# Patient Record
Sex: MALE | Race: WHITE | Employment: OTHER | URBAN - METROPOLITAN AREA
[De-identification: names, ages, dates, MRNs, and addresses within clinical notes are randomized per-mention and may not be internally consistent; named-entity substitution may affect disease eponyms.]

---

## 2018-10-19 ENCOUNTER — PATIENT OUTREACH (OUTPATIENT)
Dept: CARE COORDINATION | Facility: CLINIC | Age: 83
End: 2018-10-19

## 2018-10-19 DIAGNOSIS — Z65.9 PSYCHOSOCIAL PROBLEM: Primary | ICD-10-CM

## 2018-10-19 NOTE — LETTER
October 22, 2018    Manuelito Kinney  [id number]   [facility] [address]      Dear Manuelito,    I am a clinic care coordinator who works with PETEY Medina CNP at [facility]. I wanted to thank you for spending the time to talk with me.  I wanted to introduce myself and provide you with my contact information so that you can call me with questions or concerns about your health care. Below is a description of clinic care coordination and how I can further assist you.     The clinic care coordinator is a  who understand the health care system. The goal of clinic care coordination is to help you manage your health and improve access to the health care system in the most efficient manner. The  can assist you with financial, behavioral, psychosocial, chemical dependency, counseling, and/or psychiatric resources.    Please feel free to contact me at [phone], with any questions or concerns.      Sincerely,     Yareli Pepper    Enclosed: I have enclosed helpful educational material. Please review and call me with any questions. and I have enclosed an Authorization to Discuss Protected Health Information form. Please review, fill out, sign and send back to me so I can make sure we have this on file to be able to talk to family/friends if you would like us to be able to.

## 2018-10-19 NOTE — PROGRESS NOTES
Clinic Care Coordination Contact  [facility]/Voicemail    Referral Source: Care Team  Clinical Data: Care Coordinator Outreach  Outreach attempted x 1.  Left message on voicemail with call back information and requested return call.  Plan:  Care Coordinator will try to reach patient again in 3-5 business days.    Yareli Pepper LENORE  Clinic Care Coordinator  [phone]  [email or web address]

## 2018-10-22 ASSESSMENT — ACTIVITIES OF DAILY LIVING (ADL): DEPENDENT_IADLS:: CLEANING;COOKING;LAUNDRY;SHOPPING;MONEY MANAGEMENT;TRANSPORTATION

## 2018-10-22 NOTE — PROGRESS NOTES
Clinic Care Coordination Contact  OUTREACH    Referral Information: Care Team requesting [facility] outreach to pt to provide options for supportive living. Pt became legally blind following a stroke and is home bound and reliant on limited support system for all complex IADL's.    Chief Complaint   Patient presents with     Clinic Care Coordination - Initial     Community Support Resources        Universal Utilization: No major concerns.  Clinic Utilization is appropriate, however difficult due to lack of support in the community. Pt relies on his niece to help with most appointments. Will send TERRI for pt to complete and return to the clinic to improve communication.  Utilization    Last refreshed: 10/19/2018 11:54 AM:  No Show Count (past year) 0       Last refreshed: 10/19/2018 11:54 AM:  ED visits 0       Last refreshed: 10/19/2018 11:54 AM:  Hospital admissions 0          Current as of: 10/19/2018 11:54 AM           Clinical Concerns:  Current Medical Concerns:  Sudden onset of blindness following a stroke. Pt denies any other major concerning health issues at this time. Pt does have a history of hyperliipidemia, GERD, and CKD stage IV.    Medication Management:  Pt sets up his own medications.- Slightly concerning as he does this by touch and feel of the medication- currently on three different medications.      Functional Status:  Dependent ADLs:: Independent- Pt is able to navigate his apartment, however is bound to his apartment due to visual impairments and lack of training related to change.   Dependent IADLs:: Cleaning, Cooking, Laundry, Shopping, Money Management, Transportation  Any fall with injury in the past year?: No    Living Situation:  Current living arrangement:: I live alone  Type of residence:: Apartment- [facility]    Diet/Exercise/Sleep:  Diet:: Regular  Inadequate nutrition: No- Does at times have difficulty preparing his own food. Heavily reliant on preprepared meals.  Exercise:: Currently  "not exercising    Transportation:  Transportation concerns:: No  Transportation means:: Family- Niece and her spouse assist with transportation about once a week. Pt not wanting any alternatives at this time as he is not able to leave the apartment on his own.      Psychosocial:  Jew or spiritual beliefs that impact treatment:: No  Mental health DX:: No- Pt endorses a relatively positive outlook regarding his situation. \"I just keep kicking the can down the road.\"  Informal Support system:: Family  Financial/Insurance: Pt's niece and nephew are pt's POA and help with bill pay and writing check (high vulnerability risk)   Pt is over income criteria for any [facility] programs. Pt has looked at waiver services and will eventually need to transition to [facility] services should he need long term care services- AL is provided on campus he is currently on. Discussed Alternative Care Waiver programs.   Education provided: Senior Linkage Line, LTC assessment process through the [facility], educated on MA/EW/ACW, longterm and LTC placement- how they differ, State Services for the blind- pt  community involvement- including companionship program, private duty services to assist at home, and transport resources.      Resources and Interventions:  Current Resources: No formal supports in place  Equipment Currently Used at Home: none  Advance Care Plan/Directive- Will send information about HCD for pt to complete  Referrals Placed: Community Resources, County Resources, Honoring Choices     Patient/Caregiver understanding: Pt reports understanding and denies any additional questions or concerns at this times. FELICITAS CC engaged in AIDET communication during encounter.    Outreach Frequency: monthly    Plan: FELICITAS CC will make a referral to [facility] companionship program and send information about the Alternative Care Waiver, Advanced Care Planning materials,and a TERRI to complete and return to the clinic. Will also include contact information for SW " CC. Follow-up in 4 weeks.    Yareli Gtz [facility]  Clinic Care Coordinator  [phone]  [email or web address]

## 2018-11-23 ENCOUNTER — PATIENT OUTREACH (OUTPATIENT)
Dept: CARE COORDINATION | Facility: CLINIC | Age: 83
End: 2018-11-23

## 2018-11-23 NOTE — PROGRESS NOTES
Clinic Care Coordination Contact  OUTREACH    Chief Complaint   Patient presents with     Clinic Care Coordination - Follow-up     Community Support Resources     Spoke with pt today to review resources that were sent via mail. Pt requested that FELICITAS CC outreach to pt's gordon Mayberry. Outreach attempted x 1.  Left message on voicemail with call back information and requested return call.    Plan: Care Coordinator will try to reach patient again in 3-5 business days.    Yareli Gtz LENORE  Clinic Care Coordinator  [phone]  [email or web address]

## 2018-11-29 NOTE — PROGRESS NOTES
Clinic Care Coordination Contact  [facility]/Voicemail       Clinical Data: Care Coordinator Outreach  Outreach attempted x 2.  Left message on voicemail with call back information and requested return call.  Plan: Care Coordinator had previously mailed out a letter. Care Coordinator will do no further outreaches at this time.    Yareli Gtz [place]  Clinic Care Coordinator  [phone]  [email or web address]

## 2020-08-05 ENCOUNTER — APPOINTMENT (OUTPATIENT)
Dept: CT IMAGING | Facility: CLINIC | Age: 85
DRG: 683 | End: 2020-08-05
Attending: EMERGENCY MEDICINE
Payer: COMMERCIAL

## 2020-08-05 ENCOUNTER — HOSPITAL ENCOUNTER (INPATIENT)
Facility: CLINIC | Age: 85
LOS: 2 days | Discharge: NURSING, REHAB OR LONG-TERM CARE | DRG: 683 | End: 2020-08-07
Attending: EMERGENCY MEDICINE | Admitting: INTERNAL MEDICINE
Payer: COMMERCIAL

## 2020-08-05 ENCOUNTER — APPOINTMENT (OUTPATIENT)
Dept: GENERAL RADIOLOGY | Facility: CLINIC | Age: 85
DRG: 683 | End: 2020-08-05
Attending: EMERGENCY MEDICINE
Payer: COMMERCIAL

## 2020-08-05 ENCOUNTER — APPOINTMENT (OUTPATIENT)
Dept: GENERAL RADIOLOGY | Facility: CLINIC | Age: 85
DRG: 683 | End: 2020-08-05
Attending: HOSPITALIST
Payer: COMMERCIAL

## 2020-08-05 DIAGNOSIS — K59.00 CONSTIPATION, UNSPECIFIED CONSTIPATION TYPE: ICD-10-CM

## 2020-08-05 DIAGNOSIS — R55 SYNCOPE, UNSPECIFIED SYNCOPE TYPE: ICD-10-CM
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
## 2020-08-05 DIAGNOSIS — T79.6XXA TRAUMATIC RHABDOMYOLYSIS, INITIAL ENCOUNTER (H): ICD-10-CM

## 2020-08-05 DIAGNOSIS — G47.00 INSOMNIA, UNSPECIFIED TYPE: Primary | ICD-10-CM

## 2020-08-05 DIAGNOSIS — S09.90XA CLOSED HEAD INJURY, INITIAL ENCOUNTER: ICD-10-CM

## 2020-08-05 PROBLEM — M62.82 RHABDOMYOLYSIS: Status: ACTIVE | Noted: 2020-08-05

## 2020-08-05 PROBLEM — N17.9 ACUTE KIDNEY INJURY (H): Status: ACTIVE | Noted: 2020-08-05

## 2020-08-05 LAB
ALBUMIN UR-MCNC: 100 MG/DL
ANION GAP SERPL CALCULATED.3IONS-SCNC: 6 MMOL/L (ref 3–14)
APPEARANCE UR: CLEAR
BACTERIA #/AREA URNS HPF: ABNORMAL /HPF
BASOPHILS # BLD AUTO: 0 10E9/L (ref 0–0.2)
BASOPHILS NFR BLD AUTO: 0.2 %
BILIRUB UR QL STRIP: NEGATIVE
BUN SERPL-MCNC: 25 MG/DL (ref 7–30)
CALCIUM SERPL-MCNC: 8.7 MG/DL (ref 8.5–10.1)
CHLORIDE SERPL-SCNC: 108 MMOL/L (ref 94–109)
CK SERPL-CCNC: 7131 U/L (ref 30–300)
CO2 SERPL-SCNC: 22 MMOL/L (ref 20–32)
COLOR UR AUTO: YELLOW
CREAT SERPL-MCNC: 1.96 MG/DL (ref 0.66–1.25)
DIFFERENTIAL METHOD BLD: ABNORMAL
EOSINOPHIL # BLD AUTO: 0 10E9/L (ref 0–0.7)
EOSINOPHIL NFR BLD AUTO: 0.1 %
ERYTHROCYTE [DISTWIDTH] IN BLOOD BY AUTOMATED COUNT: 13 % (ref 10–15)
GFR SERPL CREATININE-BSD FRML MDRD: 29 ML/MIN/{1.73_M2}
GLUCOSE SERPL-MCNC: 138 MG/DL (ref 70–99)
GLUCOSE UR STRIP-MCNC: NEGATIVE MG/DL
HCT VFR BLD AUTO: 36.6 % (ref 40–53)
HGB BLD-MCNC: 12 G/DL (ref 13.3–17.7)
HGB UR QL STRIP: ABNORMAL
IMM GRANULOCYTES # BLD: 0.1 10E9/L (ref 0–0.4)
IMM GRANULOCYTES NFR BLD: 0.6 %
INR PPP: 1.07 (ref 0.86–1.14)
INTERPRETATION ECG - MUSE: NORMAL
KETONES UR STRIP-MCNC: NEGATIVE MG/DL
LABORATORY COMMENT REPORT: NORMAL
LACTATE BLD-SCNC: 0.8 MMOL/L (ref 0.7–2)
LEUKOCYTE ESTERASE UR QL STRIP: NEGATIVE
LYMPHOCYTES # BLD AUTO: 2 10E9/L (ref 0.8–5.3)
LYMPHOCYTES NFR BLD AUTO: 11.3 %
MAGNESIUM SERPL-MCNC: 1.7 MG/DL (ref 1.6–2.3)
MCH RBC QN AUTO: 32.6 PG (ref 26.5–33)
MCHC RBC AUTO-ENTMCNC: 32.8 G/DL (ref 31.5–36.5)
MCV RBC AUTO: 100 FL (ref 78–100)
MONOCYTES # BLD AUTO: 1.5 10E9/L (ref 0–1.3)
MONOCYTES NFR BLD AUTO: 8.6 %
MUCOUS THREADS #/AREA URNS LPF: PRESENT /LPF
NEUTROPHILS # BLD AUTO: 13.9 10E9/L (ref 1.6–8.3)
NEUTROPHILS NFR BLD AUTO: 79.2 %
NITRATE UR QL: NEGATIVE
NRBC # BLD AUTO: 0 10*3/UL
NRBC BLD AUTO-RTO: 0 /100
PH UR STRIP: 5.5 PH (ref 5–7)
PLATELET # BLD AUTO: 210 10E9/L (ref 150–450)
POTASSIUM SERPL-SCNC: 3.6 MMOL/L (ref 3.4–5.3)
RBC # BLD AUTO: 3.68 10E12/L (ref 4.4–5.9)
RBC #/AREA URNS AUTO: 1 /HPF (ref 0–2)
SARS-COV-2 RNA SPEC QL NAA+PROBE: NEGATIVE
SARS-COV-2 RNA SPEC QL NAA+PROBE: NORMAL
SODIUM SERPL-SCNC: 136 MMOL/L (ref 133–144)
SOURCE: ABNORMAL
SP GR UR STRIP: 1.02 (ref 1–1.03)
SPECIMEN SOURCE: NORMAL
SPECIMEN SOURCE: NORMAL
TROPONIN I SERPL-MCNC: <0.015 UG/L (ref 0–0.04)
UROBILINOGEN UR STRIP-MCNC: NORMAL MG/DL (ref 0–2)
WBC # BLD AUTO: 17.5 10E9/L (ref 4–11)
WBC #/AREA URNS AUTO: 3 /HPF (ref 0–5)

## 2020-08-05 PROCEDURE — C9803 HOPD COVID-19 SPEC COLLECT: HCPCS

## 2020-08-05 PROCEDURE — 72128 CT CHEST SPINE W/O DYE: CPT

## 2020-08-05 PROCEDURE — 80048 BASIC METABOLIC PNL TOTAL CA: CPT | Performed by: EMERGENCY MEDICINE

## 2020-08-05 PROCEDURE — 71101 X-RAY EXAM UNILAT RIBS/CHEST: CPT | Mod: LT

## 2020-08-05 PROCEDURE — 85025 COMPLETE CBC W/AUTO DIFF WBC: CPT | Performed by: EMERGENCY MEDICINE

## 2020-08-05 PROCEDURE — 96360 HYDRATION IV INFUSION INIT: CPT

## 2020-08-05 PROCEDURE — 12000011 ZZH R&B MS OVERFLOW

## 2020-08-05 PROCEDURE — 99285 EMERGENCY DEPT VISIT HI MDM: CPT | Mod: 25

## 2020-08-05 PROCEDURE — U0003 INFECTIOUS AGENT DETECTION BY NUCLEIC ACID (DNA OR RNA); SEVERE ACUTE RESPIRATORY SYNDROME CORONAVIRUS 2 (SARS-COV-2) (CORONAVIRUS DISEASE [COVID-19]), AMPLIFIED PROBE TECHNIQUE, MAKING USE OF HIGH THROUGHPUT TECHNOLOGIES AS DESCRIBED BY CMS-2020-01-R: HCPCS | Performed by: EMERGENCY MEDICINE

## 2020-08-05 PROCEDURE — 83735 ASSAY OF MAGNESIUM: CPT | Performed by: EMERGENCY MEDICINE

## 2020-08-05 PROCEDURE — 85610 PROTHROMBIN TIME: CPT | Performed by: EMERGENCY MEDICINE

## 2020-08-05 PROCEDURE — 25800030 ZZH RX IP 258 OP 636: Performed by: EMERGENCY MEDICINE

## 2020-08-05 PROCEDURE — 84484 ASSAY OF TROPONIN QUANT: CPT | Performed by: EMERGENCY MEDICINE

## 2020-08-05 PROCEDURE — 71046 X-RAY EXAM CHEST 2 VIEWS: CPT

## 2020-08-05 PROCEDURE — 73030 X-RAY EXAM OF SHOULDER: CPT | Mod: LT

## 2020-08-05 PROCEDURE — 87040 BLOOD CULTURE FOR BACTERIA: CPT | Performed by: HOSPITALIST

## 2020-08-05 PROCEDURE — 93005 ELECTROCARDIOGRAM TRACING: CPT

## 2020-08-05 PROCEDURE — 99222 1ST HOSP IP/OBS MODERATE 55: CPT | Mod: AI | Performed by: INTERNAL MEDICINE

## 2020-08-05 PROCEDURE — 72125 CT NECK SPINE W/O DYE: CPT

## 2020-08-05 PROCEDURE — 81001 URINALYSIS AUTO W/SCOPE: CPT | Performed by: EMERGENCY MEDICINE

## 2020-08-05 PROCEDURE — 25000132 ZZH RX MED GY IP 250 OP 250 PS 637: Performed by: INTERNAL MEDICINE

## 2020-08-05 PROCEDURE — 70450 CT HEAD/BRAIN W/O DYE: CPT

## 2020-08-05 PROCEDURE — 82550 ASSAY OF CK (CPK): CPT | Performed by: EMERGENCY MEDICINE

## 2020-08-05 PROCEDURE — 36415 COLL VENOUS BLD VENIPUNCTURE: CPT | Performed by: HOSPITALIST

## 2020-08-05 PROCEDURE — 83605 ASSAY OF LACTIC ACID: CPT | Performed by: HOSPITALIST

## 2020-08-05 PROCEDURE — 96361 HYDRATE IV INFUSION ADD-ON: CPT

## 2020-08-05 RX ORDER — POTASSIUM CL/LIDO/0.9 % NACL 10MEQ/0.1L
10 INTRAVENOUS SOLUTION, PIGGYBACK (ML) INTRAVENOUS
Status: DISCONTINUED | OUTPATIENT
Start: 2020-08-05 | End: 2020-08-07 | Stop reason: HOSPADM

## 2020-08-05 RX ORDER — GUAIFENESIN AND DEXTROMETHORPHAN HYDROBROMIDE 600; 30 MG/1; MG/1
1 TABLET, EXTENDED RELEASE ORAL DAILY PRN
COMMUNITY

## 2020-08-05 RX ORDER — POTASSIUM CHLORIDE 29.8 MG/ML
20 INJECTION INTRAVENOUS
Status: DISCONTINUED | OUTPATIENT
Start: 2020-08-05 | End: 2020-08-07 | Stop reason: HOSPADM

## 2020-08-05 RX ORDER — ACETAMINOPHEN 325 MG/1
650 TABLET ORAL EVERY 4 HOURS PRN
Status: DISCONTINUED | OUTPATIENT
Start: 2020-08-05 | End: 2020-08-07 | Stop reason: HOSPADM

## 2020-08-05 RX ORDER — QUETIAPINE FUMARATE 50 MG/1
50 TABLET, FILM COATED ORAL AT BEDTIME
Status: ON HOLD | COMMUNITY
End: 2020-08-07

## 2020-08-05 RX ORDER — BISACODYL 10 MG
10 SUPPOSITORY, RECTAL RECTAL DAILY PRN
Status: DISCONTINUED | OUTPATIENT
Start: 2020-08-05 | End: 2020-08-07 | Stop reason: HOSPADM

## 2020-08-05 RX ORDER — ASPIRIN 81 MG/1
81 TABLET ORAL DAILY
Status: DISCONTINUED | OUTPATIENT
Start: 2020-08-05 | End: 2020-08-05 | Stop reason: CLARIF

## 2020-08-05 RX ORDER — ONDANSETRON 4 MG/1
4 TABLET, ORALLY DISINTEGRATING ORAL EVERY 6 HOURS PRN
Status: DISCONTINUED | OUTPATIENT
Start: 2020-08-05 | End: 2020-08-07 | Stop reason: HOSPADM

## 2020-08-05 RX ORDER — CHLORAL HYDRATE 500 MG
1 CAPSULE ORAL DAILY
COMMUNITY

## 2020-08-05 RX ORDER — POTASSIUM CHLORIDE 1.5 G/1.58G
20-40 POWDER, FOR SOLUTION ORAL
Status: DISCONTINUED | OUTPATIENT
Start: 2020-08-05 | End: 2020-08-07 | Stop reason: HOSPADM

## 2020-08-05 RX ORDER — ONDANSETRON 2 MG/ML
4 INJECTION INTRAMUSCULAR; INTRAVENOUS EVERY 6 HOURS PRN
Status: DISCONTINUED | OUTPATIENT
Start: 2020-08-05 | End: 2020-08-07 | Stop reason: HOSPADM

## 2020-08-05 RX ORDER — POTASSIUM CHLORIDE 1500 MG/1
20-40 TABLET, EXTENDED RELEASE ORAL
Status: DISCONTINUED | OUTPATIENT
Start: 2020-08-05 | End: 2020-08-07 | Stop reason: HOSPADM

## 2020-08-05 RX ORDER — AMOXICILLIN 250 MG
2 CAPSULE ORAL 2 TIMES DAILY PRN
Status: DISCONTINUED | OUTPATIENT
Start: 2020-08-05 | End: 2020-08-07 | Stop reason: HOSPADM

## 2020-08-05 RX ORDER — PROCHLORPERAZINE 25 MG
12.5 SUPPOSITORY, RECTAL RECTAL EVERY 12 HOURS PRN
Status: DISCONTINUED | OUTPATIENT
Start: 2020-08-05 | End: 2020-08-07 | Stop reason: HOSPADM

## 2020-08-05 RX ORDER — AMOXICILLIN 250 MG
1 CAPSULE ORAL 2 TIMES DAILY PRN
Status: DISCONTINUED | OUTPATIENT
Start: 2020-08-05 | End: 2020-08-07 | Stop reason: HOSPADM

## 2020-08-05 RX ORDER — PROCHLORPERAZINE MALEATE 5 MG
5 TABLET ORAL EVERY 6 HOURS PRN
Status: DISCONTINUED | OUTPATIENT
Start: 2020-08-05 | End: 2020-08-07 | Stop reason: HOSPADM

## 2020-08-05 RX ORDER — TAMSULOSIN HYDROCHLORIDE 0.4 MG/1
0.4 CAPSULE ORAL DAILY
Status: DISCONTINUED | OUTPATIENT
Start: 2020-08-05 | End: 2020-08-07 | Stop reason: HOSPADM

## 2020-08-05 RX ORDER — LIDOCAINE 40 MG/G
CREAM TOPICAL
Status: DISCONTINUED | OUTPATIENT
Start: 2020-08-05 | End: 2020-08-07 | Stop reason: HOSPADM

## 2020-08-05 RX ORDER — MULTIVIT WITH MINERALS/LUTEIN
1 TABLET ORAL DAILY
COMMUNITY

## 2020-08-05 RX ORDER — MAGNESIUM SULFATE HEPTAHYDRATE 40 MG/ML
4 INJECTION, SOLUTION INTRAVENOUS EVERY 4 HOURS PRN
Status: DISCONTINUED | OUTPATIENT
Start: 2020-08-05 | End: 2020-08-07 | Stop reason: HOSPADM

## 2020-08-05 RX ORDER — SODIUM CHLORIDE 9 MG/ML
INJECTION, SOLUTION INTRAVENOUS CONTINUOUS
Status: DISCONTINUED | OUTPATIENT
Start: 2020-08-05 | End: 2020-08-06

## 2020-08-05 RX ORDER — NALOXONE HYDROCHLORIDE 0.4 MG/ML
.1-.4 INJECTION, SOLUTION INTRAMUSCULAR; INTRAVENOUS; SUBCUTANEOUS
Status: DISCONTINUED | OUTPATIENT
Start: 2020-08-05 | End: 2020-08-07 | Stop reason: HOSPADM

## 2020-08-05 RX ORDER — ACETAMINOPHEN 500 MG
500-1000 TABLET ORAL 3 TIMES DAILY
COMMUNITY

## 2020-08-05 RX ORDER — CAFFEINE 200 MG
100-300 TABLET ORAL EVERY MORNING
Status: ON HOLD | COMMUNITY
End: 2020-08-07

## 2020-08-05 RX ORDER — AMLODIPINE BESYLATE 10 MG/1
10 TABLET ORAL DAILY
Status: DISCONTINUED | OUTPATIENT
Start: 2020-08-05 | End: 2020-08-07 | Stop reason: HOSPADM

## 2020-08-05 RX ORDER — POTASSIUM CHLORIDE 7.45 MG/ML
10 INJECTION INTRAVENOUS
Status: DISCONTINUED | OUTPATIENT
Start: 2020-08-05 | End: 2020-08-07 | Stop reason: HOSPADM

## 2020-08-05 RX ADMIN — TAMSULOSIN HYDROCHLORIDE 0.4 MG: 0.4 CAPSULE ORAL at 14:29

## 2020-08-05 RX ADMIN — OMEPRAZOLE 20 MG: 20 CAPSULE, DELAYED RELEASE ORAL at 14:29

## 2020-08-05 RX ADMIN — AMLODIPINE BESYLATE 10 MG: 10 TABLET ORAL at 09:14

## 2020-08-05 RX ADMIN — Medication 2.5 MG: at 09:14

## 2020-08-05 RX ADMIN — SODIUM CHLORIDE 1000 ML: 9 INJECTION, SOLUTION INTRAVENOUS at 02:21

## 2020-08-05 RX ADMIN — Medication 2.5 MG: at 18:20

## 2020-08-05 RX ADMIN — ACETAMINOPHEN 650 MG: 325 TABLET, FILM COATED ORAL at 09:13

## 2020-08-05 ASSESSMENT — ENCOUNTER SYMPTOMS
ABDOMINAL PAIN: 0
NECK PAIN: 0
NUMBNESS: 0
COUGH: 0
HEADACHES: 0
ARTHRALGIAS: 1
VOMITING: 0
DIARRHEA: 0
FEVER: 0
WEAKNESS: 0

## 2020-08-05 ASSESSMENT — MIFFLIN-ST. JEOR
SCORE: 1455.51
SCORE: 1428.29

## 2020-08-05 NOTE — PHARMACY-ADMISSION MEDICATION HISTORY
Admission medication history interview status for this patient is complete. See [facility] admission navigator for allergy information, prior to admission medications and immunization status.     Medication history interview done via telephone during Covid-19 pandemic, indicate source(s): Patient and pt's niece Shawanda  Medication history resources (including written lists, pill bottles, clinic record): care everywhere    Changes made to PTA medication list:  Added: quetiapine, tylenol extra strength, tylenol pm, vivarin, metamucil packet  Deleted: aspirin  Changed: fish oil to once daily, omeprazole to 20 mg    Actions taken by pharmacist (provider contacted, etc): called the pt and called his niece so helps set up his meds once weekly      Additional medication history information: stopped taking aspirin due to his kidneys, he took fenofibrate for his triglycerides (which were over 700), but he took 2 doses and stopped due to side effects. The pt is taking a medication for nightmares -- I believe it is quetiapine, as this is his only nighttime med and was filled on 7/28.    Medication reconciliation/reorder completed by provider prior to medication history?  Y   (Y/N)     For patients on insulin therapy: N  (Y/N)    Prior to Admission medications    Medication Sig Last Dose Taking? Auth Provider   acetaminophen (TYLENOL) 500 MG tablet Take 500-1,000 mg by mouth 3 times daily  8/3/2020 Yes Unknown, Entered By History   amLODIPine (NORVASC) 10 MG tablet Take 1 tablet (10 mg) by mouth daily . Patient prefers generic. 8/3/2020 Yes Kwame Garcia MD   caffeine (NO-DOZE) 200 MG TABS tablet Take 100-300 mg by mouth every morning 8/3/2020 at am Yes Unknown, Entered By History   dextromethorphan-guaiFENesin (MUCINEX DM)  MG 12 hr tablet Take 1 tablet by mouth daily as needed for cough 8/3/2020 Yes Unknown, Entered By History   diphenhydrAMINE-acetaminophen (TYLENOL PM)  MG tablet Take 1 tablet by mouth nightly as  needed for sleep 8/2/2020 at pm Yes Unknown, Entered By History   fish oil-omega-3 fatty acids 1000 MG capsule Take 1 g by mouth daily 8/3/2020 Yes Unknown, Entered By History   multivitamin (CENTRUM SILVER) tablet Take 1 tablet by mouth daily 8/3/2020 at am Yes Unknown, Entered By History   omeprazole (PRILOSEC) 20 MG DR capsule Take 20 mg by mouth daily 8/3/2020 Yes Unknown, Entered By History   psyllium (METAMUCIL/KONSYL) Packet Take 1 packet by mouth daily 8/3/2020 Yes Unknown, Entered By History   QUEtiapine (SEROQUEL) 50 MG tablet Take 50 mg by mouth At Bedtime  8/2/2020 at Unknown time Yes Unknown, Entered By History   Lactobacillus Rhamnosus, GG, (CULTURELL) capsule Take 1 capsule by mouth 3 times daily Unknown at Unknown time  Waqas Lanza, DO   loperamide (IMODIUM) 2 MG capsule Take 1 capsule (2 mg) by mouth 4 times daily as needed for diarrhea Unknown at Unknown time  Waqas Lanza, DO   Multiple Vitamins-Minerals (MULTIVITAMIN & MINERAL PO) Take 1 tablet by mouth daily  8/3/2020 at am  Reported, Patient   tamsulosin (FLOMAX) 0.4 MG 24 hr capsule Take 1 capsule (0.4 mg) by mouth daily 8/3/2020  Kwame Garcia MD

## 2020-08-05 NOTE — ED NOTES
"[facility]  ED Nurse Handoff Report    Manuelito Kinney is a 89 year old male   ED Chief complaint: Fall and Back Pain  . ED Diagnosis:   Final diagnoses:   Syncope, unspecified syncope type   Closed head injury, initial encounter     Allergies:   Allergies   Allergen Reactions     Monosodium Glutamate      Nsaids GI Disturbance     Soy        Code Status: Full Code  Activity level - Baseline/Home:  Independent. Activity Level - Current:   Assist X 2. Lift room needed: No. Bariatric: No   Needed: No   Isolation: No. Infection: Not Applicable.     Vital Signs:   Vitals:    08/05/20 0141   BP: 138/71   Resp: 22   Temp: 98  F (36.7  C)   TempSrc: Oral   SpO2: 97%   Weight: 79.4 kg (175 lb)   Height: 1.727 m (5' 8\")       Cardiac Rhythm:  ,      Pain level: 0-10 Pain Scale: 7  Patient confused: No. Patient Falls Risk: Yes.   Elimination Status: Has voided   Patient Report - Initial Complaint: Pt to ER with c/o fall . Focused Assessment: Pt to ER with c/o fall from possible near syncopal episode, pt is  blind  But very sharp mentally, pt has chronic back pain but was c/o rib pain as well as back pain after fall  Tests Performed: EKG labs CTs and xrays. UA Abnormal Results: WBC 17.5 , CK 7131 all other values are pretty much WNLs.   Treatments provided: IVFs meds  Family Comments: Here by himself  OBS brochure/video discussed/provided to patient:  Yes  ED Medications:   Medications   0.9% sodium chloride BOLUS (1,000 mLs Intravenous New Bag 8/5/20 0221)     Drips infusing:  No  For the majority of the shift, the patient's behavior Green. Interventions performed wereN/A.    Sepsis treatment initiated: No       ED Nurse Name/Phone Number: Guerline Kinney RN,   5:08 AM    RECEIVING UNIT ED HANDOFF REVIEW    Above ED Nurse Handoff Report was reviewed: Yes  Reviewed by: Rebecca Cornelius RN on August 5, 2020 at 6:34 AM         "

## 2020-08-05 NOTE — CONSULTS
"Care Transition Initial Assessment - RN        Met with: Nurse Tom at [facility].  DATA   Active Problems:    Fall    Traumatic rhabdomyolysis (H)    Acute kidney injury (H)    Rhabdomyolysis          Contact information and PCP information verified: No  Lives With: alone         Description of Support System: Supportive, Involved   Who is your support system?: Other (specify)(Niece)   Support Assessment: Adequate family and caregiver support   Insurance concerns: No Insurance issues identified  ASSESSMENT  Patient currently receives the following services:  Patient lives at [facility], Phone -[phone]/ [phone] Fax [phone]. Patient has a staff assisted walk 3 times weekly and gets VS checks daily per facility COVID protocol.         Identified issues/concerns regarding health management: RN CTS following for discharge planning. Patient admitted after 2 falls around the same time of day at [facility], hitting head resulting in a \"bump\" on the back of his head after the first fall.   Patient uses a tri-wheeled walker for ambulation. Patient is independent with ambulation and ADL's. Per nurse at [facility], patient has a niece who is supportive and assists with shopping and meal prep.   [facility] requests an early timing of discharge. Patient will need to be back to prior level of activity/ baseline prior to returning to [facility].   COVID test and PT/OT consults pending at this time.   PLAN  Financial costs for the patient were not discussed .       Plan/Disposition: TBD   Appointments: None made at this time.       Care  (CTS) will continue to follow as needed.    Lucina Medrano MA/RN Case Manager  Inpatient Care Coordination  [facility]   [phone]          "

## 2020-08-05 NOTE — PLAN OF CARE
Patient is alert and oriented x4. Patient did get lethargic after oxycodone administration. Pt blind and needing assist with feedings. Pt having pain in L rib cage and low back. Tylenol and oxycodone available as PRNs. Temp of 101.8F this morning. Came down without tylenol. Pt has elevated CK at 7131, IVF. Increased creat and WBC also. Repeat chest xray showing atelactasis. No abx at this time, continue monitoring temps and reassess this evening. Pt has not gotten out of bed, using slide board/hover veronica for transfers. Pt reports feeling weak. Pt has audible exp wheezes this am, has since resolved. Pt needing 1LPM via NC to maintain O2 sats above 92%. IS teaching done and helped patient with this throughout day. Pt was a PUI earlier this morning. Covid came back negative. Tolerating regular diet. PT/OT rescheduled for tomorrow d/t previous PUI status. Tele. SR 64.

## 2020-08-05 NOTE — H&P
[facility]  History and Physical   Hospitalist Service    Jarvis Garcia MD    Manuelito Kinney MRN# [id number]   YOB: 1930 Age: 89 year old      Date of Admission:  8/5/2020           Assessment and Plan:   Manuelito Kinney is a 89-year-old male with history of blindness, migraines, renal stone disease, GERD, chronic kidney disease, tonsillectomy, cystoscopy, and lumbar spine fusion.  He was brought to the emergency department from his senior facility early this morning after a second fall in 2 days.  He fell on the night of 8/3/2020.  Paramedics were called to the senior facility and helped him get up off the floor.  He was assessed and found to have some left-sided mild shoulder and chest pain.  He was doing okay so they left him at home.  One day later on 8/4/2020 he had a second fall.  Paramedics were called again.  They helped him up and brought him into emergency department for evaluation.  Manuelito denied loss of consciousness.  He thought that he just fell, possibly related to his impaired vision.  Emergency department evaluation showed unremarkable vital signs.  CBC showed leukocytosis with white blood cell count of 17.5.  Basic metabolic panel showed elevated creatinine of 1.96 (baseline seems to be approximate 1.6).  CK was elevated at 7131.  Troponin was undetectable.  INR was 1.07.  Urinalysis showed moderate blood and few bacteria but only 3 white blood cells, no nitrite, and no site esterase.  X-ray of shoulder shows no acute fracture or dislocation.  X-ray of ribs showed no fracture.  Left lung base atelectasis was noted.  I was asked to admit Manuelito to the hospital with rhabdomyolysis and acute kidney injury after probable mechanical fall.    Problem list:    1.  Probable mechanical fall resulting in neck and left shoulder strain.  Consider syncope although patient denies loss of consciousness.  Patient suspects the falls related to his blindness.  Physical therapy  occupational therapy will be consulted.    2.  Rhabdomyolysis with CK of 7131.  Hydrate with normal saline and 100 mL/h.  Check basic metabolic panel daily to monitor renal function.  Check CK level daily.    3.  Acute kidney injury, likely due to rhabdomyolysis, in the setting of chronic kidney disease stage III.  Monitor renal function with IV fluid hydration.  Avoid nephrotoxins.    4.  Leukocytosis.  Consider stress response.  No clear infection.  Monitor.  If fever develops obtain blood cultures and chest x-ray.    5.  Chronic blindness.    6.  GERD.  Continue prior to admission Prilosec.    Full code  Mechanical DVT prophylaxis  Disposition: Admit as inpatient as 2 nights of hospitalization are expected           Code Status:   Full Code         Primary Care Physician:   Karolina Osborn [phone]         Chief Complaint:   Falls    History is obtained from Dr. Terra Billings, and the medical record         History of Present Illness:   Manuelito Kinney is a 89-year-old male with history of blindness, migraines, renal stone disease, GERD, chronic kidney disease, tonsillectomy, cystoscopy, and lumbar spine fusion.  He was brought to the emergency department from his senior facility early this morning after a second fall in 2 days.  He fell on the night of 8/3/2020.  Paramedics were called to the senior facility and helped him get up off the floor.  He was assessed and found to have some left-sided mild shoulder and chest pain.  He was doing okay so they left him at home.  One day later on 8/4/2020 he had a second fall.  Paramedics were called again.  They helped him up and brought him into emergency department for evaluation.  Manuelito denied loss of consciousness.  He thought that he just fell, possibly related to his impaired vision.  Emergency department evaluation showed unremarkable vital signs.  CBC showed leukocytosis with white blood cell count of 17.5.  Basic metabolic panel showed elevated  creatinine of 1.96 (baseline seems to be approximate 1.6).  CK was elevated at 7131.  Troponin was undetectable.  INR was 1.07.  Urinalysis showed moderate blood and few bacteria but only 3 white blood cells, no nitrite, and no site esterase.  X-ray of shoulder shows no acute fracture or dislocation.  X-ray of ribs showed no fracture.  Left lung base atelectasis was noted.  I was asked to admit Manuelito to the hospital with rhabdomyolysis and acute kidney injury after probable mechanical fall.           Past Medical History:     Patient Active Problem List   Diagnosis     Mixed hyperlipidemia     HTN, goal below 140/90     Lumbago     GERD (gastroesophageal reflux disease)     Prostate cancer (H)     Chronic renal insufficiency, stage II (mild)     Renal calculus     Benign hypertension     Sepsis (H)     Chronic kidney disease, stage IV (severe) (H)     Fall     Traumatic rhabdomyolysis (H)     Acute kidney injury (H)      Past Medical History:   Diagnosis Date     Acid reflux      Calculus of kidney      Rash and other nonspecific skin eruption      Swelling of limb     joint pain/swelling     Variants of migraine, not elsewhere classified, without mention of intractable migraine without mention of status migrainosus              Past Surgical History:     Past Surgical History:   Procedure Laterality Date     C LUMBAR SPINE FUSION,ANTER APPRCH  2002    5 vertibrae lumbar area- affects R hip     CL AFF SURGICAL PATHOLOGY  2000    prostate cancer with radiation     HC CYSTOSCOPY,INSERT URETERAL STENT  3/15/10    LT     HC CYSTOURETHROSCOPY W/ URETEROSCOPY &/OR PYELOSCOPY, DIAGNOSTIC  3/15/10    RT     TONSILLECTOMY              Home Medications:     Prior to Admission medications    Medication Sig Last Dose Taking? Auth Provider   amLODIPine (NORVASC) 10 MG tablet Take 1 tablet (10 mg) by mouth daily . Patient prefers generic.   Kwame Garcia MD   ASPIRIN 81 MG OR TABS ONE DAILY   Mirtha Woodard MD  "  Dextromethorphan-Guaifenesin (MUCINEX DM PO) Take 1 tablet by mouth as needed   Reported, Patient   FISH OIL 1000 MG OR CAPS one three times a day  Patient taking differently: one TWICE DAILY   Mirtha Woodard MD   Lactobacillus Rhamnosus, GG, (CULTURELL) capsule Take 1 capsule by mouth 3 times daily   Waqas Lanza,    loperamide (IMODIUM) 2 MG capsule Take 1 capsule (2 mg) by mouth 4 times daily as needed for diarrhea   Waqas Lanza DO   Multiple Vitamins-Minerals (MULTIVITAMIN & MINERAL PO) Take 1 tablet by mouth daily    Reported, Patient   omeprazole (PRILOSEC) 40 MG capsule Take 1 capsule (40 mg) by mouth daily   Kwame Garcia MD   tamsulosin (FLOMAX) 0.4 MG 24 hr capsule Take 1 capsule (0.4 mg) by mouth daily   Kwame Garcia MD   UNABLE TO FIND MEDICATION NAME: H togo supplement   Unknown, Entered By History            Allergies:     Allergies   Allergen Reactions     Monosodium Glutamate      Nsaids GI Disturbance     Soy             Social History:     Social History     Tobacco Use     Smoking status: Never Smoker     Smokeless tobacco: Never Used   Substance Use Topics     Alcohol use: Yes     Comment: very rarely, 1-2 times a year             Family History:     Family History   Problem Relation Age of Onset     Cancer Brother         brain     Hypertension Mother      Hypertension Father      Hypertension Brother      Prostate Cancer Brother      C.A.D. No family hx of      Diabetes No family hx of      Cancer - colorectal No family hx of      Breast Cancer No family hx of               Review of Systems:   The 10 point Review of Systems is negative other than as noted in the HPI.           Physical Exam:   Blood pressure 138/71, temperature 98  F (36.7  C), temperature source Oral, resp. rate 22, height 1.727 m (5' 8\"), weight 79.4 kg (175 lb), SpO2 97 %.  175 lbs 0 oz      GENERAL: Pleasant and cooperative. No acute distress.  EYES: Blind. No conjunctival erythema or " icterus.  ENT: External ears are normal without deformity. Posterior oropharynx is without erythem, swelling, or exudate.  NECK: Supple. No masses or swelling. No tenderness. Thyroid is normal without mass or tenderness.  CHEST: Clear to auscultation. Normal breath sounds. No retractions.   CV: Regular rate and rhythm. No JVD. Pulses normal.  ABDOMEN: Bowel sounds present. No tenderness. No masses or hernia.  EXTREMETIES: No clubbing, cyanosis, or ischemia.  SKIN: Warm and dry to touch. No wounds or rashes.  NEUROLOGIC: Strength and sensation are normal. Deep tendon reflexes are normal. Cranial nerves are normal.             Data:   All new lab and imaging data was reviewed.     Results for orders placed or performed during the hospital encounter of 08/05/20 (from the past 24 hour(s))   CBC with platelets differential   Result Value Ref Range    WBC 17.5 (H) 4.0 - 11.0 10e9/L    RBC Count 3.68 (L) 4.4 - 5.9 10e12/L    Hemoglobin 12.0 (L) 13.3 - 17.7 g/dL    Hematocrit 36.6 (L) 40.0 - 53.0 %     78 - 100 fl    MCH 32.6 26.5 - 33.0 pg    MCHC 32.8 31.5 - 36.5 g/dL    RDW 13.0 10.0 - 15.0 %    Platelet Count 210 150 - 450 10e9/L    Diff Method Automated Method     % Neutrophils 79.2 %    % Lymphocytes 11.3 %    % Monocytes 8.6 %    % Eosinophils 0.1 %    % Basophils 0.2 %    % Immature Granulocytes 0.6 %    Nucleated RBCs 0 0 /100    Absolute Neutrophil 13.9 (H) 1.6 - 8.3 10e9/L    Absolute Lymphocytes 2.0 0.8 - 5.3 10e9/L    Absolute Monocytes 1.5 (H) 0.0 - 1.3 10e9/L    Absolute Eosinophils 0.0 0.0 - 0.7 10e9/L    Absolute Basophils 0.0 0.0 - 0.2 10e9/L    Abs Immature Granulocytes 0.1 0 - 0.4 10e9/L    Absolute Nucleated RBC 0.0    Basic metabolic panel   Result Value Ref Range    Sodium 136 133 - 144 mmol/L    Potassium 3.6 3.4 - 5.3 mmol/L    Chloride 108 94 - 109 mmol/L    Carbon Dioxide 22 20 - 32 mmol/L    Anion Gap 6 3 - 14 mmol/L    Glucose 138 (H) 70 - 99 mg/dL    Urea Nitrogen 25 7 - 30 mg/dL     Creatinine 1.96 (H) 0.66 - 1.25 mg/dL    GFR Estimate 29 (L) >60 mL/min/[1.73_m2]    GFR Estimate If Black 34 (L) >60 mL/min/[1.73_m2]    Calcium 8.7 8.5 - 10.1 mg/dL   Troponin I   Result Value Ref Range    Troponin I ES <0.015 0.000 - 0.045 ug/L   INR   Result Value Ref Range    INR 1.07 0.86 - 1.14   CK total   Result Value Ref Range    CK Total 7,131 (HH) 30 - 300 U/L   EKG 12-lead, tracing only   Result Value Ref Range    Interpretation ECG Click View Image link to view waveform and result    Head CT w/o contrast    Narrative    EXAM: CT HEAD W/O CONTRAST  LOCATION: [facility]  DATE/TIME: 8/5/2020 3:00 AM    INDICATION: Fall. Pain.  COMPARISON: CT head dated 06/03/2015.  TECHNIQUE: Routine without IV contrast. Multiplanar reformats. Dose reduction techniques were used.    FINDINGS:  INTRACRANIAL CONTENTS: No intracranial hemorrhage, extraaxial collection, or mass effect.  Age-indeterminate right basal ganglia lacunar type infarction. No acute large territory infarction. Moderate presumed chronic small vessel ischemic changes. Mild   to moderate generalized volume loss. No hydrocephalus.     VISUALIZED ORBITS/SINUSES/MASTOIDS: Prior bilateral cataract surgery. Visualized portions of the orbits are otherwise unremarkable. No paranasal sinus mucosal disease. No middle ear or mastoid effusion.    BONES/SOFT TISSUES: Left posterior scalp soft tissue contusion. No acute osseous abnormality.      Impression    IMPRESSION:  1.  No CT evidence for acute traumatic intracranial process.  2.  Age-indeterminate right basal ganglia lacunar type infarction. If the patient is experiencing an acute, focal or ongoing neurologic deficit, consider MRI for further assessment.  3.  Brain atrophy and presumed chronic microvascular ischemic changes as above.   Cervical spine CT w/o contrast    Narrative    EXAM: CT CERVICAL SPINE W/O CONTRAST  LOCATION: [facility]  DATE/TIME: 8/5/2020 3:00 AM    INDICATION:  Trauma. Fall. Pain.  COMPARISON: None.  TECHNIQUE: Routine without IV contrast. Multiplanar reformats. Dose reduction techniques were used.    FINDINGS:  VERTEBRA: Normal vertebral body heights. 3 mm of grade 1 anterolisthesis at C4 on C5. Trace grade 1 anterolisthesis of the C7 on T1. No acute fracture.     CANAL/FORAMINA: Multilevel degenerative changes of the cervical spine. No high-grade central spinal canal stenosis. There is moderate left neural foraminal stenosis at C3-C4. There is severe left neural foraminal stenosis at C4-C5. There is severe right   and moderate left neural foraminal stenosis at C5-C6. There is moderate bilateral neural foraminal stenosis at C6-C7.    PARASPINAL: No prevertebral soft tissue swelling.      Impression    IMPRESSION:  1.  No acute fracture identified.   2.  Grade 1 anterolisthesis at C4 on C5 and C7 on T1. These findings are likely degenerative in etiology, however, ligamentous injury could have a similar appearance.  3.  Multilevel degenerative changes of the cervical spine, as above.   CT Thoracic Spine w/o Contrast    Narrative    EXAM: CT THORACIC SPINE W/O CONTRAST  LOCATION: [facility]  DATE/TIME: 8/5/2020 3:00 AM    INDICATION: Trauma. Pain. Fall.  COMPARISON: None.  TECHNIQUE: Routine without IV contrast. Multiplanar reformats.  Dose reduction techniques were used.     FINDINGS:  VERTEBRA: Normal vertebral body heights. No acute traumatic malalignment. No acute fracture.     CANAL/FORAMINA: Multilevel degenerative changes are present. Multilevel anterolateral osteophytes are seen. No high-grade canal or neural foraminal stenosis.    PARASPINAL: No acute extraspinal abnormality. Scattered atheromatous vascular calcification of the aorta. Bilateral lower lobe dependent atelectasis is seen.      Impression    IMPRESSION:  1.  No fracture or posttraumatic subluxation.  2.  No high-grade spinal canal or neural foraminal stenosis.     Ribs XR, unilat 3 views +  PA chest,  left    Narrative    EXAM: XR RIBS AND CHEST LEFT 3 VIEW  LOCATION: [facility]  DATE/TIME: 08/05/2020, 3:44 AM    INDICATION: Left-sided rib pain after fall.  COMPARISON: 03/10/2017.    FINDINGS: Frontal chest and four views of the left ribs. The heart size is normal. There is atelectasis at the left lung base. The lungs are otherwise clear. No pneumothorax. No acute rib fracture.      Impression    IMPRESSION: Left basilar atelectasis. No rib fractures.     XR Shoulder Left G/E 3 Views    Narrative    EXAM: XR SHOULDER LEFT G/E 3 VIEW  LOCATION: [facility]  DATE/TIME: 08/05/2020, 3:45 AM    INDICATION: Pain after fall.  COMPARISON: None.      Impression    IMPRESSION: No acute fracture or dislocation.      UA with Microscopic   Result Value Ref Range    Color Urine Yellow     Appearance Urine Clear     Glucose Urine Negative NEG^Negative mg/dL    Bilirubin Urine Negative NEG^Negative    Ketones Urine Negative NEG^Negative mg/dL    Specific Gravity Urine 1.021 1.003 - 1.035    Blood Urine Moderate (A) NEG^Negative    pH Urine 5.5 5.0 - 7.0 pH    Protein Albumin Urine 100 (A) NEG^Negative mg/dL    Urobilinogen mg/dL Normal 0.0 - 2.0 mg/dL    Nitrite Urine Negative NEG^Negative    Leukocyte Esterase Urine Negative NEG^Negative    Source Catheterized Urine     WBC Urine 3 0 - 5 /HPF    RBC Urine 1 0 - 2 /HPF    Bacteria Urine Few (A) NEG^Negative /HPF    Mucous Urine Present (A) NEG^Negative /LPF

## 2020-08-05 NOTE — ED NOTES
Bed: ED08  Expected date: 8/5/20  Expected time: 1:32 AM  Means of arrival: Ambulance  Comments:  Kris [address]96 89M fall, back pain

## 2020-08-05 NOTE — PROGRESS NOTES
Progress Note    Patient admitted earlier today. H&P reviewed. Chart reviewed.  Spiked temp this am: 101.8  Mildly hypoxic to high 80's, now needing 1 L  Had spine imaging on admission which included CXR: L basilar atelectasis  UA appears non-infected    89 year old with history  blindness, migraines, renal stone disease, GERD, chronic kidney disease, tonsillectomy, cystoscopy, and lumbar spine fusion, prostate CA admitted 8/5/2020 with falls x 1    Falls    - appear to have been mechanical (lives alone, blind, needs walker)    - will have PT assess after covid testing results    - may need TCU    Fever    - no clear source    - has elevated on WBC (17.5)    - now mildly hypoxic    - re-swabbed as symptomatic covid: addendum: returned negative    - UA neg    - blood culture obtained    - had CXR with rib imaging in ED, will hold off on any further imaging unless changes clinicallly    - addendum: will obtain 2 view CXR in setting of fever, mild hypoxia and now negative covid    Addendum: reviewed CXR with radiologist. Feels LLL findings are atelectasis. Will not start antibiotics at this point. If has fever overnight, would start azith/ceftriaxone to cover CAP.    Rhabdomyolysis    - CK 7131    - IVF at 100cc/hr    - Cr 1.96 (may be his baseline, last Cr was from 2016 at 1.6)      CKD    - unclear what his recent baseline is    - IVF as above    Blindness    GERD    - cont PPI    Called niece and left her a message updating her.    Robby Barr MD

## 2020-08-05 NOTE — PLAN OF CARE
ROOM # 220     Living Situation (if not independent, order SW consult): [facility]   Facility name: [facility]  : Niece    Activity level at baseline: Ind with walker  Activity level on admit: Ax2      Patient registered to observation; given Patient Bill of Rights; given the opportunity to ask questions about observation status and their plan of care.  Patient has been oriented to the observation room, bathroom and call light is in place.    Discussed discharge goals and expectations with patient/family.

## 2020-08-05 NOTE — PLAN OF CARE
OT/PT: After thorough chart review and discussion with care team, decision made to hold PT/OT eval while testt pending for COVID-19. Will re-assess status daily.

## 2020-08-05 NOTE — PROGRESS NOTES
Pt up to recliner with assist of 2, pivoted to recliner, pt unsteady, felt weak, hunched over walker and needed lots of cuing.

## 2020-08-05 NOTE — PLAN OF CARE
"Vitals:BP (!) 141/69 (BP Location: Right arm)   Temp 98.2  F (36.8  C) (Oral)   Resp 22   Ht 1.753 m (5' 9\")   Wt 80.5 kg (177 lb 8 oz)   SpO2 96%   BMI 26.21 kg/m    Labs:CK 7,131, creat 1.96, COVID neg  Cardiac:WDL, SR with 1st degree AVB HR 60s   Resp:Pt reports pain over left side with deep breathing, IS encouraged, 1L NC started on day shift  Neuro:WDL, blind baseline  GI:WDL, LBM 8/3  :Pt reports hesitancy and has incontinence, hx prostate cancer  Skin:WDL  Activity:Up with 2 and walker to recliner  Diet:Regular, assist with feeding  Pain:Pt had oxycodone on day shift which caused sleepiness, avoiding tylenol as to not mask fever, pt declined medication and ice pack for left rib and hip pain initially. Pt. Given 2.5mg oxycodone for right hip pain which is chronic per pt and left rib pain which is acute  Plan:Watch for fevers, PT/OT, IV fluids     "

## 2020-08-05 NOTE — ED TRIAGE NOTES
"Pt presents via EMS for evaluation of left sided rib and upper back pain. Had a fall last night, EMS was called, but pt was not transported. Tonight, pt was on the toilet and was found on the floor. Unsure how he got on the floor, but states \"his legs went under him\". Was on the roseann for about an hour before pushing pendant. Lives independently at [facility]. Gets weekly tests for COVID at [facility], all have been negative. Hx of chronic back pain. Pt is legally blind.  "

## 2020-08-05 NOTE — ED PROVIDER NOTES
History     Chief Complaint: Fall and Back Pain    HPI   Manuelito Kinney is a 89 year old male with history of CKD, prostate cancer, legal blindness who presents with fall and syncopal event.  Patient lives in independent type senior living facility.  Patient states that 24 hours ago while he was getting ready for bed, he was reaching for something in his room when he reached out and fell to the ground, hitting the left side of his head and shoulder and chest.  He denies LOC at that time. He did not seek medical evaluation at that time or go to the hospital but paramedics came to evaluate him and helped him off the ground.  This evening while in the bathroom, patient was getting ready for bed and next thing he remembers he was on the ground.  He pressed his lifeline button.  Currently he has ongoing pain in the left shoulder, between the shoulder blades, and in the left chest wall.  He is blind at baseline but denies any new numbness or weakness in extremities.  He denies any new neck pain or low back pain or headaches or fever or cough or vomiting or diarrhea or abdominal pain.    Allergies:  No known drug allergies    Medications:    Seroquel  Amlodipine  Aspirin 81 mg    Past Medical History:    Hyperlipidemia  Hypertension  Gastro-esophageal reflux disease  Prostate cancer  Sepsis   Chronic kidney disease stage 4  Migraines    Past Surgical History:    Lumbar spine fusion  Prostate cancer radiation treatment  Ureteral stent placement  Tonsillectomy    Family History:    Brain cancer (brother)  Hypertension (mother, father, brother)  Prostate cancer (brother)    Social History:  Smoking status: Never  Alcohol use: Yes  Drug use: No  PCP: Karolina Osborn  Presents to the ED via EMS from [facility]  Marital Status:  Single [1]    Review of Systems   Constitutional: Negative for fever.   Respiratory: Negative for cough.    Gastrointestinal: Negative for abdominal pain, diarrhea and vomiting.   Musculoskeletal:  "Positive for arthralgias. Negative for neck pain.   Neurological: Positive for syncope. Negative for weakness, numbness and headaches.   All other systems reviewed and are negative.      Physical Exam     Patient Vitals for the past 24 hrs:   BP Temp Temp src Heart Rate Resp SpO2 Height Weight   08/05/20 0141 138/71 98  F (36.7  C) Oral 99 22 97 % 1.727 m (5' 8\") 79.4 kg (175 lb)     Physical Exam  HENT: scalp atraumatic. No midface instability.   EYES: pupils equal, dysconjugate gaze  NECK: no midline C spine ttp nor stepoffs  CV: Rate as noted, regular rhythm.  RESP: Effort normal. Symmetric chest rise, Breath sounds are present bilaterally  CHEST: No chest wall tenderness with AP and but mild ttp over left lateral chest wall  GI: Abdomen not tender, not distended  : no perineal rashes  PELVIS: stable  NEURO:   Motor 6=Obeys commands   Verbal 5=Oriented   Eye Opening 4=Spontaneous   GCS Total: 15     No facial asymmetry, FERRIS equally but weakness in BLE limits hip flexion strength testing. SILT x4 extremities.   EXTREMITIES: No deformity of the extremities, Pain with PROM of L shoulder  BACK: + T spine ttp but  No L spine ttp nor step-offs  SKIN: warm and dry    Emergency Department Course   ECG (02:39:31):  Rate 91 bpm. CT interval 210. QRS duration 94. QT/QTc 342/420. P-R-T axes 44 -37 36. Sinus rhythm with 1st degree A-V block. Left axis deviation. Nonspecific ST and T wave abnormality. Abnormal ECG. No significant change compared to EKG dated 06/03/2015. Interpreted at 2045 by Santana Ellison MD.    Imaging:  Radiographic findings were communicated with the patient who voiced understanding of the findings.    Head CT w/o contrast:  No CT evidence for acute traumatic intracranial process. Age-indeterminate right basal ganglia lacunar type infarction. If the patient is experiencing an acute, focal or ongoing neurologic deficit, consider MRI for further assessment. Brain atrophy and presumed chronic " microvascular ischemic changes as above.   As read by Radiology.    Cervical spine CT w/o contrast:  No acute fracture identified. Grade 1 anterolisthesis at C4 on C5 and C7 on T1. These findings are likely degenerative in etiology, however, ligamentous injury could have a similar appearance. Multilevel degenerative changes of the cervical spine, as above.   As read by Radiology.    CT Thoracic Spine w/o Contrast:  No fracture or posttraumatic subluxation. No high-grade spinal canal or neural foraminal stenosis.   As read by Radiology.    Ribs XR, unilat 3 views + PA chest,  Left:  Left basilar atelectasis. No rib fractures.   As read by Radiology.    XR Shoulder Left G/E 3 Views:  No acute fracture or dislocation.   As read by Radiology.    Laboratory:  CBC: WBC 17.5 (H), HGB 12.0 (L) o/w WNL ()  BMP: Glucose 138 (H), Creatinine 1.96 (H), GFR 29 (L) o/w WNL  INR: 1.07  Troponin (collected 0217): <0.015  CK total: 7131 (HH)  Urinalysis with micro: Moderate blood, Protein albumin 100, Few bacteria, Mucous present, o/w Negative  Asymptomatic COVID-19 virus PCR swab: pending    Procedures: None.    Interventions:  0221 NS 1L IV Bolus    Emergency Department Course:  Past medical records, nursing notes, and vitals reviewed.  0200: I performed an exam of the patient and obtained history, as documented above.    IV inserted and Blood drawn. This was sent to the lab for further testing, results above.    EKG performed, results above.    The patient was sent for a XRs of the Left Shoulder and Ribs while in the emergency department, findings above.    The patient was sent for CTs of the Cervical Spine, Thoracic Spine, and Head while in the emergency department, findings above.    Findings and plan explained to the patient who consents to admission.     0522: Discussed the patient with Dr. Garcia, who will admit the patient to an Obs bed for further monitoring, evaluation, and treatment.     Impression & Plan       Covid-19  Manuelito Kinney was evaluated during a global COVID-19 pandemic, which necessitated consideration that the patient might be at risk for infection with the SARS-CoV-2 virus that causes COVID-19.   Applicable protocols for evaluation were followed during the patient's care.   COVID-19 was considered as part of the patient's evaluation. The plan for testing is:  a test was obtained during this visit.    Medical Decision Making:  Patient presents to the ER for evaluation of fall yesterday and syncopal event tonight.  On arrival vital signs within normal range.  On exam he has pain with passive range of motion of the left shoulder, tenderness along the left chest wall, tenderness along the thoracic back.  He is moving all extremities equally although has baseline weakness in lower extremities limiting strength testing.  I do not detect any obvious new focal deficits compared to his reported baseline.  GCS is 15.    CT of the head and thoracic spine and cervical spine reveal no evidence of fracture or intracranial hemorrhage.  X-ray of the left shoulder and ribs/chest reveal no evidence of fracture or dislocation pneumothorax or obvious pneumonia.  EKG is sinus rhythm at a single troponin is negative.  No evidence of ACS at this time.  Labs are notable for leukocytosis of 17, evidence of CKD.  Cath UA reveals no evidence of UTI.  CK was elevated at 7000 and 1 L normal saline bolus was initiated in the ER.    Patient has had one fall and one syncopal event in the past 24 to 48 hours.  I am concerned about his safety at home as well as the underlying cause of the syncopal event.  He was admitted to the hospital for further evaluation.    Diagnosis:    ICD-10-CM    1. Syncope, unspecified syncope type  R55 CK total     Asymptomatic COVID-19 Virus (Coronavirus) by PCR   2. Closed head injury, initial encounter  S09.90XA    3. Traumatic rhabdomyolysis, initial encounter (H)  T79.6XXA      Disposition: Admitted to  inpatient    I, Tej Lopez, am serving as a scribe at 3:24 AM on 8/5/2020 to document services personally performed by Santana Ellison MD based on my observations and the provider's statements to me.     Tej Lopez  8/5/2020   [facility] EMERGENCY DEPARTMENT       Santana Ellison MD  08/05/20 0618

## 2020-08-06 ENCOUNTER — APPOINTMENT (OUTPATIENT)
Dept: OCCUPATIONAL THERAPY | Facility: CLINIC | Age: 85
DRG: 683 | End: 2020-08-06
Attending: INTERNAL MEDICINE
Payer: COMMERCIAL

## 2020-08-06 ENCOUNTER — APPOINTMENT (OUTPATIENT)
Dept: PHYSICAL THERAPY | Facility: CLINIC | Age: 85
DRG: 683 | End: 2020-08-06
Attending: INTERNAL MEDICINE
Payer: COMMERCIAL

## 2020-08-06 LAB
ANION GAP SERPL CALCULATED.3IONS-SCNC: 4 MMOL/L (ref 3–14)
BUN SERPL-MCNC: 19 MG/DL (ref 7–30)
CALCIUM SERPL-MCNC: 7.8 MG/DL (ref 8.5–10.1)
CHLORIDE SERPL-SCNC: 110 MMOL/L (ref 94–109)
CK SERPL-CCNC: 4229 U/L (ref 30–300)
CO2 SERPL-SCNC: 25 MMOL/L (ref 20–32)
CREAT SERPL-MCNC: 1.64 MG/DL (ref 0.66–1.25)
ERYTHROCYTE [DISTWIDTH] IN BLOOD BY AUTOMATED COUNT: 13.1 % (ref 10–15)
GFR SERPL CREATININE-BSD FRML MDRD: 36 ML/MIN/{1.73_M2}
GLUCOSE SERPL-MCNC: 119 MG/DL (ref 70–99)
HCT VFR BLD AUTO: 31.8 % (ref 40–53)
HGB BLD-MCNC: 10.2 G/DL (ref 13.3–17.7)
MCH RBC QN AUTO: 32.2 PG (ref 26.5–33)
MCHC RBC AUTO-ENTMCNC: 32.1 G/DL (ref 31.5–36.5)
MCV RBC AUTO: 100 FL (ref 78–100)
PLATELET # BLD AUTO: 184 10E9/L (ref 150–450)
POTASSIUM SERPL-SCNC: 3.5 MMOL/L (ref 3.4–5.3)
RBC # BLD AUTO: 3.17 10E12/L (ref 4.4–5.9)
SODIUM SERPL-SCNC: 139 MMOL/L (ref 133–144)
WBC # BLD AUTO: 14 10E9/L (ref 4–11)

## 2020-08-06 PROCEDURE — 25000128 H RX IP 250 OP 636: Performed by: HOSPITALIST

## 2020-08-06 PROCEDURE — 82550 ASSAY OF CK (CPK): CPT | Performed by: INTERNAL MEDICINE

## 2020-08-06 PROCEDURE — 85027 COMPLETE CBC AUTOMATED: CPT | Performed by: INTERNAL MEDICINE

## 2020-08-06 PROCEDURE — 25000128 H RX IP 250 OP 636: Performed by: INTERNAL MEDICINE

## 2020-08-06 PROCEDURE — 12000011 ZZH R&B MS OVERFLOW

## 2020-08-06 PROCEDURE — 97530 THERAPEUTIC ACTIVITIES: CPT | Mod: GP | Performed by: PHYSICAL THERAPIST

## 2020-08-06 PROCEDURE — 36415 COLL VENOUS BLD VENIPUNCTURE: CPT | Performed by: INTERNAL MEDICINE

## 2020-08-06 PROCEDURE — 25000132 ZZH RX MED GY IP 250 OP 250 PS 637: Performed by: INTERNAL MEDICINE

## 2020-08-06 PROCEDURE — 99232 SBSQ HOSP IP/OBS MODERATE 35: CPT | Performed by: HOSPITALIST

## 2020-08-06 PROCEDURE — 25800030 ZZH RX IP 258 OP 636: Performed by: INTERNAL MEDICINE

## 2020-08-06 PROCEDURE — 80048 BASIC METABOLIC PNL TOTAL CA: CPT | Performed by: INTERNAL MEDICINE

## 2020-08-06 PROCEDURE — 97161 PT EVAL LOW COMPLEX 20 MIN: CPT | Mod: GP | Performed by: PHYSICAL THERAPIST

## 2020-08-06 PROCEDURE — 97535 SELF CARE MNGMENT TRAINING: CPT | Mod: GO

## 2020-08-06 PROCEDURE — 97165 OT EVAL LOW COMPLEX 30 MIN: CPT | Mod: GO

## 2020-08-06 RX ORDER — HEPARIN SODIUM 5000 [USP'U]/.5ML
5000 INJECTION, SOLUTION INTRAVENOUS; SUBCUTANEOUS EVERY 8 HOURS
Status: DISCONTINUED | OUTPATIENT
Start: 2020-08-06 | End: 2020-08-07 | Stop reason: HOSPADM

## 2020-08-06 RX ADMIN — HEPARIN SODIUM 5000 UNITS: 10000 INJECTION, SOLUTION INTRAVENOUS; SUBCUTANEOUS at 21:11

## 2020-08-06 RX ADMIN — Medication 2.5 MG: at 08:00

## 2020-08-06 RX ADMIN — Medication 2.5 MG: at 16:22

## 2020-08-06 RX ADMIN — SODIUM CHLORIDE: 9 INJECTION, SOLUTION INTRAVENOUS at 02:11

## 2020-08-06 RX ADMIN — ACETAMINOPHEN 650 MG: 325 TABLET, FILM COATED ORAL at 11:11

## 2020-08-06 RX ADMIN — HEPARIN SODIUM 5000 UNITS: 10000 INJECTION, SOLUTION INTRAVENOUS; SUBCUTANEOUS at 11:12

## 2020-08-06 RX ADMIN — TAMSULOSIN HYDROCHLORIDE 0.4 MG: 0.4 CAPSULE ORAL at 08:01

## 2020-08-06 RX ADMIN — OMEPRAZOLE 20 MG: 20 CAPSULE, DELAYED RELEASE ORAL at 08:01

## 2020-08-06 RX ADMIN — ONDANSETRON 4 MG: 4 TABLET, ORALLY DISINTEGRATING ORAL at 03:04

## 2020-08-06 RX ADMIN — AMLODIPINE BESYLATE 10 MG: 10 TABLET ORAL at 08:01

## 2020-08-06 ASSESSMENT — ACTIVITIES OF DAILY LIVING (ADL): PREVIOUS_RESPONSIBILITIES: MEAL PREP

## 2020-08-06 ASSESSMENT — MIFFLIN-ST. JEOR: SCORE: 1465.49

## 2020-08-06 NOTE — PROGRESS NOTES
08/06/20 0850   Quick Adds   Type of Visit Initial Occupational Therapy Evaluation   Living Environment   Lives With alone   Living Arrangements independent living facility   Home Accessibility no concerns   Transportation Anticipated family or friend will provide   Living Environment Comment Pt lives alone in ILF apartment, no environmental concerns, walk in shower with shower chair.    Self-Care   Usual Activity Tolerance good   Current Activity Tolerance fair   Regular Exercise Yes   Activity/Exercise Type walking   Equipment Currently Used at Home shower chair   Activity/Exercise/Self-Care Comment Pt reports walking with staff 3x/wk for ~15-20 minutes   Functional Level   Ambulation 1-->assistive equipment   Transferring 1-->assistive equipment   Toileting 0-->independent   Bathing 1-->assistive equipment   Dressing 0-->independent   Eating 0-->independent   Communication 0-->understands/communicates without difficulty   Swallowing 0-->swallows foods/liquids without difficulty   Cognition 0 - no cognition issues reported   Fall history within last six months yes   Number of times patient has fallen within last six months 2   Which of the above functional risks had a recent onset or change? transferring;toileting;bathing;dressing   Prior Functional Level Comment Pt reports mod I in all ADLs, light meal prep and mobility tasks with use of 3ww. Receives 1 meal/day, assist for homemaking tasks (cleaning, laundry, errands). Reports niece assists with errands. Pt has life alert button.    General Information   Onset of Illness/Injury or Date of Surgery - Date 08/05/20   Referring Physician Jarvis Garcia MD    Patient/Family Goals Statement Pt's goal is to d/c home   Additional Occupational Profile Info/Pertinent History of Current Problem Per chart: Pt is a 89 year old male admitted with rhabdomyolysis and acute kidney injury after probable mechanical fall   Precautions/Limitations fall precautions   Cognitive  Status Examination   Orientation orientation to person, place and time   Level of Consciousness alert;confused   Visual Perception   Visual Perception Comments Legally blind   Sensory Examination   Sensory Comments Pt denies numbness/tingling in BUEs   Pain Assessment   Patient Currently in Pain Yes, see Vital Sign flowsheet  (pain in L ribs)   Range of Motion (ROM)   ROM Comment BUEs WFL   Strength   Strength Comments Generalized weakness BUEs   Hand Strength   Hand Strength Comments WFL   Transfer Skill: Bed to Chair/Chair to Bed   Level of Sunflower: Bed to Chair minimum assist (75% patients effort)   Physical Assist/Nonphysical Assist: Bed to Chair 1 person assist   Assistive Device - Transfer Skill Bed to Chair Chair to Bed Rehab Eval rolling walker   Transfer Skill: Sit to Stand   Level of Sunflower: Sit/Stand minimum assist (75% patients effort)   Physical Assist/Nonphysical Assist: Sit/Stand 1 person assist   Assistive Device for Transfer: Sit/Stand rolling walker   Balance   Balance Comments CGA provided while in stance FWW level   Lower Body Dressing   Level of Sunflower: Dress Lower Body moderate assist (50% patients effort)   Physical Assist/Nonphysical Assist: Dress Lower Body 1 person assist   Toileting   Level of Sunflower: Toilet minimum assist (75% patients effort)   Physical Assist/Nonphysical Assist: Toilet 1 person assist   Grooming   Level of Sunflower: Grooming minimum assist (75% patients effort)   Physical Assist/Nonphysical Assist: Grooming 1 person assist   Instrumental Activities of Daily Living (IADL)   Previous Responsibilities meal prep   IADL Comments Does receive assist for most IADLs   Clinical Impression   Criteria for Skilled Therapeutic Interventions Met yes, treatment indicated   OT Diagnosis Impaired ADLs, IADLs and mobility tasks   Influenced by the following impairments Decreased functional activity tolerance and strength, impaired balance, pain   Assessment of  "Occupational Performance 5 or more Performance Deficits   Identified Performance Deficits Bathing, dressing, grooming, toileting, transfers   Clinical Decision Making (Complexity) Low complexity   Therapy Frequency Daily   Predicted Duration of Therapy Intervention (days/wks) 4 days   Anticipated Discharge Disposition Transitional Care Facility   Risks and Benefits of Treatment have been explained. Yes   Patient, Family & other staff in agreement with plan of care Yes   [facility] AM-PAC  \"6 Clicks\" Daily Activity Inpatient Short Form   1. Putting on and taking off regular lower body clothing? 2 - A Lot   2. Bathing (including washing, rinsing, drying)? 2 - A Lot   3. Toileting, which includes using toilet, bedpan or urinal? 2 - A Lot   4. Putting on and taking off regular upper body clothing? 2 - A Lot   5. Taking care of personal grooming such as brushing teeth? 3 - A Little   6. Eating meals? 3 - A Little   Daily Activity Raw Score (Score out of 24.Lower scores equate to lower levels of function) 14   Total Evaluation Time   Total Evaluation Time (Minutes) 10     "

## 2020-08-06 NOTE — PLAN OF CARE
A&Ox4. VSS. LS diminished, some expiratory wheezes noted this AM. O2 sats stable on RA, 92%. Patient has a hx of blindness. Some difficulty following verbal cues this AM. Up w/ Ax2 w/ Hali Steady. BS active x4, tolerating regular diet, needs meal assistance, passing flatus. Pt. has been up in chair for all meals. Voiding in adequate amt's. Incontinent of bowel and bladder. C/o L-sided rib pain and hip pain (chronic per pt), pain managed w/ po Oxycodone and Tylenol. IVF infusing this AM, now SL. Please encourage fluids. PT and SW/CC consulted, see notes. Plan is to discharge Friday 8/7 to [facility] w/ [facility]/C transport at 1215. Will continue to monitor.

## 2020-08-06 NOTE — PROGRESS NOTES
[facility]    Medicine Progress Note - Hospitalist Service       Date of Admission:  8/5/2020  Assessment & Plan   89 year old with history  blindness, migraines, renal stone disease, GERD, chronic kidney disease, tonsillectomy, cystoscopy, and lumbar spine fusion, prostate CA admitted 8/5/2020 with falls x 1     Falls    - appear to have been mechanical (lives alone, blind, needs walker)    - seen by OT: rec TCU    - social work aware and will start process of finding TCU    - has left sided chest wall pain from fall: pain control with tylenol, low dose oxy     Fever    - had temp of 101.8 at 7:45am on 8/5 (yesterday)    - no clear source of infection: UA appears clean, covid was negative, blood cultures neg to date    - due to mild hypoxia, 2 view CXR done yesterday which I personally reviewed with Dr. Mcdaniel. He felt the changes in the LLL were due to atelectasis    - no antibiotics started    - afebrile overnight    - had elevated WBC (17.5), today is 14    - no clear source    - cont to monitor: if spikes temp, would likely cover for CAP with azith/ceftriax    Rhabdomyolysis    - CK 7131>>4229    - IVF at 100cc/hr: will stop today: encourage PO intake    - Cr 1.96>> 1.64 (may be his baseline, last Cr was from 2016 at 1.6)      CKD    - unclear what his recent baseline is    - he is likely at his baseline     Blindness     GERD    - cont PPI     Called niece and left her a message updating her.        Diet: Combination Diet Regular Diet Adult    DVT Prophylaxis: Heparin subcutaneous (start today)  Lopez Catheter: not present  Code Status: Full Code           Disposition Plan   Expected discharge: 1-2 days, recommended to transitional care unit once safe disposition plan/ TCU bed available.  Entered: Robby Barr MD 08/06/2020, 10:37 AM       The patient's care was discussed with the Bedside Nurse, Patient and Patient's Family.    Robby Barr MD  Hospitalist Service  [facility]  [facility]    ______________________________________________________________________    Interval History   Patient in chair. States he feels well today. Ate breakfast. Still has some left sided chest wall pain. No other chest pain, sob, abdo pain, n/v/d.    Data reviewed today: I reviewed all medications, new labs and imaging results over the last 24 hours. I personally reviewed no images or EKG's today.    Physical Exam   Vital Signs: Temp: 98.2  F (36.8  C) Temp src: Oral BP: 112/54 Pulse: 88 Heart Rate: 88 Resp: 20 SpO2: 92 % O2 Device: None (Room air) Oxygen Delivery: 1 LPM  Weight: 179 lbs 11.2 oz  Constitutional: awake, alert, cooperative, no apparent distress, and appears stated age  Eyes: Lids and lashes normal, pupils equal, round and reactive to light, extra ocular muscles intact, sclera clear, conjunctiva normal  ENT: Normocephalic, without obvious abnormality, atraumatic, sinuses nontender on palpation, external ears without lesions, oral pharynx with moist mucous membranes, tonsils without erythema or exudates, gums normal and good dentition.  Respiratory: No increased work of breathing, good air exchange, has crackles in left base that do not clear with cough. No wheezing  Cardiovascular: Normal apical impulse, regular rate and rhythm, normal S1 and S2, no S3 or S4, and no murmur noted  GI: No scars, normal bowel sounds, soft, non-distended, non-tender, no masses palpated, no hepatosplenomegally  Skin: no bruising or bleeding  Musculoskeletal: no lower extremity pitting edema present    Data   Recent Labs   Lab 08/06/20  0517 08/05/20  0217   WBC 14.0* 17.5*   HGB 10.2* 12.0*    100    210   INR  --  1.07    136   POTASSIUM 3.5 3.6   CHLORIDE 110* 108   CO2 25 22   BUN 19 25   CR 1.64* 1.96*   ANIONGAP 4 6   SUDHA 7.8* 8.7   * 138*   TROPI  --  <0.015     Recent Results (from the past 24 hour(s))   XR Chest 2 Views    Narrative    CHEST TWO VIEWS  8/5/2020 12:29 PM     HISTORY:  Mild hypoxia, fever.     COMPARISON: Earlier today.    FINDINGS: Suboptimal inspiration with hypoventilatory changes. Mild  left basilar atelectasis, increased compared with the previous exam.  Mild cardiomegaly again seen.      Impression    IMPRESSION:  I suspect a mild left pleural effusion. No acute  consolidation.    JELANI SCHROEDER MD

## 2020-08-06 NOTE — PLAN OF CARE
"OT: Order received, eval completed and treatment initiated. Pt is a 89 year old male admitted with rhabdomyolysis and acute kidney injury after probable mechanical fall.  Pt lives alone in ILF apartment, no environmental concerns, walk in shower with shower chair. Pt reports mod I in all ADLs, light meal prep and mobility tasks with use of 3ww. Receives 1 meal/day, assist for homemaking tasks (cleaning, laundry, errands). Reports niece assists with errands. Pt has life alert button. Pt reports walking with staff 3x/wk for ~15-20 minutes.     Discharge Planner OT   Patient plan for discharge: Hopeful to return home, however open to rehab options  Current status: Pt completed sit > stand from chair to FWW x2 with min A. Pt able to ambulate short distance in room FWW level with min A, required maximal step by step verbal cueing for transfers/positioning due to low vision. Pt with difficulty following commands accurately during mobility. Pt required mod A for LB dressing task while seated, required set up and vc for breakfast mgmt due to low vision. Pain reported in L ribs/side with activity. Endorses \"feeling weak.\"   Barriers to return to prior living situation: Lives alone, decreased functional activity tolerance and strength, impaired balance, fall risk/fall history, low vision  Recommendations for discharge: TCU  Rationale for recommendations: Pt is below baseline level of functioning with regards to ADLs/IADls and mobility tasks. Pt in agreement he \"feels weaker than normal\" and is concerned with caring for himself if he returns directly home. Recommend ongoing skilled OT while IP and in TCU setting to improve strength, functional activity tolerance, balance and safety needed for daily tasks.        Entered by: Brandy Cespedes 08/06/2020 10:10 AM       "

## 2020-08-06 NOTE — PLAN OF CARE
"BP (!) 156/79 (BP Location: Right arm)   Temp 98.6  F (37  C) (Oral)   Resp 20   Ht 1.753 m (5' 9\")   Wt 80.5 kg (177 lb 8 oz)   SpO2 90%   BMI 26.21 kg/m      Neuro: Alert and oriented x 4  Cardiac: WNL; sinus rhythm with HR 80s per tele tech  Lungs: Clear but shallow; pain with deep breathing; has been on RA since 2330  GI: Last BM 8/3  : Incontinence; history of prostate cancer  Skin: WNL  Pain: PRN Oxycodone 2.5 mg for severe pain; not given this shift. Has chronic R hip pain  IV: NS @ 100 mL/hr  Meds: PRN oxycodone 2.5 mg, zofran given at 0310 for nausea  Labs/Test: CK 7131, CXR shows LLL atelectasis. No ABX at this time  Diet: Regular; tray set up and assist with feeding due to blindness  Activity: Assist x 2 with gait belt, walker, and verbal guidance/cues  Plan: Monitor for fevers, IVF, PT/OT consulting    "

## 2020-08-06 NOTE — CONSULTS
"Care Transition Initial Assessment - RN        Met with: Patient and Family, Gordon Mayberry.  DATA   Active Problems:    Fall    Traumatic rhabdomyolysis (H)    Acute kidney injury (H)    Rhabdomyolysis       Cognitive Status: awake and alert.        Contact information and PCP information verified: No  Lives With: alone   Living Arrangements: independent living facility     Description of Support System: Supportive, Involved   Who is your support system?: Other (specify)(Niece)   Support Assessment: Adequate family and caregiver support   Insurance concerns: No Insurance issues identified  ASSESSMENT    Identified issues/concerns regarding health management: RN CTS met with patient and spoke with gordon Mayberry over the phone. TCU is recommended at discharge. Patient states \"I'm not sure that I need that.\" Discussed that patient is not at baseline for mobility and would benefit from increased care and therapy at TCU prior to returning home independently. Patient agreeable to TCU referrals and will discuss TCU recommendation further with therapy this afternoon. Discussed TCU coverage with Medicare.   Pt/family was provided with the Medicare Compare list for SNF.  Discussed associated Medicare star ratings to assist with choice for referrals/discharge planning Yes    Education was given to pt/family that star ratings are updated/maintained by Medicare and can be reviewed by visiting www.medicare.gov Yes No further information requested at this time.  TCU ref[facility] were [facility] to [facility], [facility], [facility] [place]. Patient prefers a semi private room.     PLAN  Financial costs for the patient include TCU coverage through Medicare and [employer] .  Patient given options and choices for discharge Yes .  Patient/family is agreeable to the plan?  Yes: pending further treatment and discussion with therapy.   Patient anticipates discharging to TCU .  Resources List: Skilled Nursing Facility   "   Patient anticipates needs for home equipment: No  Transportation/person available to transport on day of discharge  is [facility] medical [facility] vs. Radha Mayberry.   Plan/Disposition: TCU   Appointments: None made at this time.       Care  (CTS) will continue to follow as needed.    Lucina Medrano MA/RN Case Manager  Inpatient Care Coordination  [facility]   [phone]

## 2020-08-06 NOTE — PROGRESS NOTES
Discharge Planner   Discharge Plans in progress: Yes, TCU recommended. Patient has been accepted to [facility] TCU. Patient and niberry Shawanda agreeable. Updated nursing at [facility] of discharge plans.   Reviewed out of pocket cost for [employer]  transport, $75 for base rate and $5 per mile to the destination. Pt/family expressed understanding and are agreeable to this.   [employer] transport scheduled for Friday 8/7 at 1215.  Barriers to discharge plan: None anticipated.   Follow up plan: Care coordinator available for any further discharge needs.        Entered by: Lucina Medrano 08/06/2020 3:42 PM       Lucina Medrano MA/RN Case Manager  Inpatient Care Coordination  [facility]   [phone]

## 2020-08-06 NOTE — PROGRESS NOTES
08/06/20 1520   Quick Adds   Type of Visit Initial PT Evaluation   Living Environment   Lives With alone   Living Arrangements apartment   Home Accessibility no concerns   Transportation Anticipated family or friend will provide   Living Environment Comment Walk in shower with shower chair   Self-Care   Usual Activity Tolerance good   Current Activity Tolerance fair   Regular Exercise Yes   Activity/Exercise Type walking   Equipment Currently Used at Home shower chair;walker, rolling   Functional Level Prior   Ambulation 1-->assistive equipment   Transferring 1-->assistive equipment   Toileting 0-->independent   Bathing 1-->assistive equipment   Fall history within last six months yes   Number of times patient has fallen within last six months 2   Which of the above functional risks had a recent onset or change? ambulation;transferring;toileting;bathing;dressing   Prior Functional Level Comment Receives 1 meal/day, assist for homemaking, niece assists with errands, wears a pendant   General Information   Onset of Illness/Injury or Date of Surgery - Date 08/05/20   Referring Physician  Jarvis Garcia MD     Patient/Family Goals Statement Discharge to home   Pertinent History of Current Problem (include personal factors and/or comorbidities that impact the POC) 89 year old with history  blindness, migraines, renal stone disease, GERD, chronic kidney disease, tonsillectomy, cystoscopy, and lumbar spine fusion, prostate CA admitted 8/5/2020 with falls x 1   Precautions/Limitations fall precautions   Weight-Bearing Status - LLE full weight-bearing   Weight-Bearing Status - RLE full weight-bearing   General Observations Pt supine upon initiation, agreeable to session.    Cognitive Status Examination   Orientation orientation to person, place and time   Level of Consciousness alert   Follows Commands and Answers Questions 100% of the time   Personal Safety and Judgment intact   Memory intact   Pain Assessment  "  Patient Currently in Pain No   Integumentary/Edema   Integumentary/Edema no deficits were identifed   Posture    Posture Forward head position;Protracted shoulders   Range of Motion (ROM)   ROM Comment WFL   Strength   Strength Comments Decreased functional strength as seen by pt's need for assist with mobility   Bed Mobility   Bed Mobility Comments sit<>supine with SBA   Transfer Skills   Transfer Comments sit<>stand with Tray   Gait   Gait Comments Tray with FWW   Balance   Balance Comments Requires B UE support for safe dynamic mobility   Coordination   Coordination no deficits were identified   Muscle Tone   Muscle Tone no deficits were identified   General Therapy Interventions   Planned Therapy Interventions balance training;bed mobility training;gait training;ROM;strengthening;stretching;transfer training;home program guidelines;progressive activity/exercise   Clinical Impression   Criteria for Skilled Therapeutic Intervention yes, treatment indicated   PT Diagnosis Impaired functional mobiltiy   Influenced by the following impairments Weakness, deconditioning   Functional limitations due to impairments Difficulty with bed mobiltiy, transfers, ambulation   Clinical Presentation Stable/Uncomplicated   Clinical Presentation Rationale medically stable   Clinical Decision Making (Complexity) Low complexity   Therapy Frequency 5x/week   Predicted Duration of Therapy Intervention (days/wks) 5 days   Anticipated Discharge Disposition Transitional Care Facility   Risk & Benefits of therapy have been explained Yes   Patient, Family & other staff in agreement with plan of care Yes   [facility] [employer] TM \"6 Clicks\"   2016, Trustees of [facility], under license to [employer].  All rights reserved.   6 Clicks Short Forms Basic Mobility Inpatient Short Form   [facility] [employer]  \"6 Clicks\" V.2 Basic Mobility Inpatient Short Form   1. Turning from your back to your side while in a flat bed without " using bedrails? 3 - A Little   2. Moving from lying on your back to sitting on the side of a flat bed without using bedrails? 3 - A Little   3. Moving to and from a bed to a chair (including a wheelchair)? 3 - A Little   4. Standing up from a chair using your arms (e.g., wheelchair, or bedside chair)? 3 - A Little   5. To walk in hospital room? 2 - A Lot   6. Climbing 3-5 steps with a railing? 2 - A Lot   Basic Mobility Raw Score (Score out of 24.Lower scores equate to lower levels of function) 16   Total Evaluation Time   Total Evaluation Time (Minutes) 5

## 2020-08-06 NOTE — PLAN OF CARE
PT: Orders received. PT evaluation completed and treatment initiated. Pt reports living in an apartment with no stairs to enter. Pt ambulates with a walker. Pt manages most of his ADLs indep, but does receive some help with IADLs (homemaking, grocery shopping). Pt with 2 falls in past 6 mos.     Discharge Planner PT   Patient plan for discharge: Would prefer home, but open to TCU  Current status: Pt supine upon initiation, agreeable to session. Pt requires significant verbal cuing due to low vision. Pt completes sit<>supine with SBA and cues for technique. Pt completes sit<>stand with Tray and cues for technique. Pt feels uncomfortable attempting to ambulate far from the bedside at this time. Pt returns to sitting EOB. Utilized sarasteady to transfer pt from bedside to bedside chair. Pt requires Tray for sit<>stands utilizing sarasteady. Significant time discussing discharge recommendations and rationale for those recommendations. Pt reports understanding and is agreeable to TCU if needed, but would like to continue to work with therapies to hopefully progress to home discharge. Pt sitting in bedside chair with bed alarm active and call light within reach. Family present as well.   Barriers to return to prior living situation: Decreased activity tolerance, requires assist with mobility, fall risk  Recommendations for discharge: TCU  Rationale for recommendations: Pt is not currently at baseline for mobility, and is unsafe to discharge home. With continued PT, both IP and after discharge, pt is likely to obtain mobility goals.        Entered by: Tereza Alejo 08/06/2020 3:30 PM

## 2020-08-07 VITALS
RESPIRATION RATE: 15 BRPM | OXYGEN SATURATION: 92 % | HEART RATE: 83 BPM | HEIGHT: 69 IN | SYSTOLIC BLOOD PRESSURE: 143 MMHG | TEMPERATURE: 97.9 F | BODY MASS INDEX: 26.62 KG/M2 | DIASTOLIC BLOOD PRESSURE: 73 MMHG | WEIGHT: 179.7 LBS

## 2020-08-07 LAB
ANION GAP SERPL CALCULATED.3IONS-SCNC: 8 MMOL/L (ref 3–14)
BASOPHILS # BLD AUTO: 0 10E9/L (ref 0–0.2)
BASOPHILS NFR BLD AUTO: 0.2 %
BUN SERPL-MCNC: 22 MG/DL (ref 7–30)
CALCIUM SERPL-MCNC: 8.3 MG/DL (ref 8.5–10.1)
CHLORIDE SERPL-SCNC: 109 MMOL/L (ref 94–109)
CK SERPL-CCNC: 3165 U/L (ref 30–300)
CO2 SERPL-SCNC: 19 MMOL/L (ref 20–32)
CREAT SERPL-MCNC: 1.63 MG/DL (ref 0.66–1.25)
DIFFERENTIAL METHOD BLD: ABNORMAL
EOSINOPHIL # BLD AUTO: 0.1 10E9/L (ref 0–0.7)
EOSINOPHIL NFR BLD AUTO: 0.8 %
ERYTHROCYTE [DISTWIDTH] IN BLOOD BY AUTOMATED COUNT: 13.1 % (ref 10–15)
GFR SERPL CREATININE-BSD FRML MDRD: 36 ML/MIN/{1.73_M2}
GLUCOSE SERPL-MCNC: 115 MG/DL (ref 70–99)
HCT VFR BLD AUTO: 35.6 % (ref 40–53)
HGB BLD-MCNC: 11.3 G/DL (ref 13.3–17.7)
IMM GRANULOCYTES # BLD: 0.1 10E9/L (ref 0–0.4)
IMM GRANULOCYTES NFR BLD: 0.5 %
LYMPHOCYTES # BLD AUTO: 2.3 10E9/L (ref 0.8–5.3)
LYMPHOCYTES NFR BLD AUTO: 17.6 %
MACROCYTES BLD QL SMEAR: PRESENT
MCH RBC QN AUTO: 32.5 PG (ref 26.5–33)
MCHC RBC AUTO-ENTMCNC: 31.7 G/DL (ref 31.5–36.5)
MCV RBC AUTO: 102 FL (ref 78–100)
MONOCYTES # BLD AUTO: 0.9 10E9/L (ref 0–1.3)
MONOCYTES NFR BLD AUTO: 6.7 %
NEUTROPHILS # BLD AUTO: 9.5 10E9/L (ref 1.6–8.3)
NEUTROPHILS NFR BLD AUTO: 74.2 %
NRBC # BLD AUTO: 0 10*3/UL
NRBC BLD AUTO-RTO: 0 /100
PLATELET # BLD AUTO: 194 10E9/L (ref 150–450)
PLATELET # BLD EST: ABNORMAL 10*3/UL
POTASSIUM SERPL-SCNC: 4.2 MMOL/L (ref 3.4–5.3)
RBC # BLD AUTO: 3.48 10E12/L (ref 4.4–5.9)
SODIUM SERPL-SCNC: 136 MMOL/L (ref 133–144)
WBC # BLD AUTO: 12.8 10E9/L (ref 4–11)

## 2020-08-07 PROCEDURE — 25000132 ZZH RX MED GY IP 250 OP 250 PS 637: Performed by: INTERNAL MEDICINE

## 2020-08-07 PROCEDURE — 85025 COMPLETE CBC W/AUTO DIFF WBC: CPT | Performed by: HOSPITALIST

## 2020-08-07 PROCEDURE — 82550 ASSAY OF CK (CPK): CPT | Performed by: HOSPITALIST

## 2020-08-07 PROCEDURE — 99239 HOSP IP/OBS DSCHRG MGMT >30: CPT | Performed by: INTERNAL MEDICINE

## 2020-08-07 PROCEDURE — 36415 COLL VENOUS BLD VENIPUNCTURE: CPT | Performed by: HOSPITALIST

## 2020-08-07 PROCEDURE — 25000128 H RX IP 250 OP 636: Performed by: HOSPITALIST

## 2020-08-07 PROCEDURE — 80048 BASIC METABOLIC PNL TOTAL CA: CPT | Performed by: HOSPITALIST

## 2020-08-07 RX ORDER — GUAIFENESIN 600 MG/1
600 TABLET, EXTENDED RELEASE ORAL 2 TIMES DAILY
Status: DISCONTINUED | OUTPATIENT
Start: 2020-08-07 | End: 2020-08-07

## 2020-08-07 RX ORDER — AMOXICILLIN 250 MG
1 CAPSULE ORAL 2 TIMES DAILY PRN
DISCHARGE
Start: 2020-08-07

## 2020-08-07 RX ORDER — OXYCODONE HYDROCHLORIDE 5 MG/1
2.5 TABLET ORAL EVERY 6 HOURS PRN
Qty: 10 TABLET | Refills: 0 | Status: SHIPPED | OUTPATIENT
Start: 2020-08-07

## 2020-08-07 RX ORDER — BISACODYL 10 MG
10 SUPPOSITORY, RECTAL RECTAL DAILY PRN
DISCHARGE
Start: 2020-08-07

## 2020-08-07 RX ORDER — GUAIFENESIN 600 MG/1
600 TABLET, EXTENDED RELEASE ORAL 2 TIMES DAILY
Status: DISCONTINUED | OUTPATIENT
Start: 2020-08-07 | End: 2020-08-07 | Stop reason: HOSPADM

## 2020-08-07 RX ORDER — QUETIAPINE FUMARATE 50 MG/1
50 TABLET, FILM COATED ORAL
DISCHARGE
Start: 2020-08-07

## 2020-08-07 RX ADMIN — TAMSULOSIN HYDROCHLORIDE 0.4 MG: 0.4 CAPSULE ORAL at 08:21

## 2020-08-07 RX ADMIN — HEPARIN SODIUM 5000 UNITS: 10000 INJECTION, SOLUTION INTRAVENOUS; SUBCUTANEOUS at 05:10

## 2020-08-07 RX ADMIN — AMLODIPINE BESYLATE 10 MG: 10 TABLET ORAL at 08:21

## 2020-08-07 RX ADMIN — OMEPRAZOLE 20 MG: 20 CAPSULE, DELAYED RELEASE ORAL at 08:21

## 2020-08-07 RX ADMIN — GUAIFENESIN 600 MG: 600 TABLET, EXTENDED RELEASE ORAL at 08:21

## 2020-08-07 RX ADMIN — GUAIFENESIN 600 MG: 600 TABLET, EXTENDED RELEASE ORAL at 02:52

## 2020-08-07 NOTE — PLAN OF CARE
Discharge teaching done, questions answered. Personal belongings pack up and leaving with pt. Pt leaving via transport at 1215 to tcu.

## 2020-08-07 NOTE — PLAN OF CARE
"[id number]    Inpatient Progress Note:    BP (!) 160/76 (BP Location: Left arm)   Pulse 82   Temp 98.3  F (36.8  C) (Oral)   Resp 18   Ht 1.753 m (5' 9\")   Wt 81.5 kg (179 lb 11.2 oz)   SpO2 92%   BMI 26.54 kg/m      Orientation: WNL, ex. forgetful at times  Neuro: Patient blind at baseline  Pain status: Denies pain  Activity: Ax2 Hali Steady with gait belt  Resp: Patient has intermittent wheezes with coughing, was on 1-2L O2 during coughing episode due to sats being in the upper 80s. Patient stated he felt like he needed to \"cough something up\" . Patient requested Mucinex, provider paged, Mucinex given, relief reported. Patient now 92-95% on RA.   Cardiac: WNL ex.HTN, SR 80s per tele tech  GI: fecal incontinence, although did not occur this shift.   : urinary incontinence  Pertinent Labs: ck elevation, wbc 12.8  Diet: Regular   Consults: PT, OT, CC all have seen patient  Discharge Plan: Discharge to [facility] in [place] for TCU. HE wc ride at 12:15pm.    Will continue to monitor and provide cares.   Patient did have temp of 100.1F, but room was hot and patient had many blankets on, temp recheck 98.3F.     Chrissie Duarte RN          "

## 2020-08-07 NOTE — DISCHARGE SUMMARY
[facility]  Discharge Summary  Name: Manuelito Kinney    MRN: [id number]  YOB: 1930    Age: 89 year old  Date of Discharge:  8/7/2020  Date of Admission: 8/5/2020  Primary Care Provider: [facility]  Discharge Physician:  Kenyatta Davenport MD  Discharging Service:  Hospitalist      Discharge Diagnoses:  1. Falls  2. Fever  3. Rhabdomyolysis  4. Acute on Chronic Kidney Disease  5. Blindness  6. GERD     Follow-ups Needed After Discharge   Follow up with PCP upon discharge from TCU    Unresulted Labs Ordered in the Past 30 Days of this Admission     No orders found from 10/16/2018 to 12/16/2018.        Hospital Course:  Manuelito Kinney is a 89 year old male with past medical history of blindness, migraines, renal stone disease, GERD, chronic kidney disease and prostate cancer who was admitted 8/5/2020 after a fall and was found to have fever.  No bacterial evidence of infection, did not receive antibiotics.  Will discharge to TCU.     Falls    - appear to have been mechanical (lives alone, blind, needs walker)    - seen by OT: rec TCU    - has left sided chest wall pain from fall: pain control with tylenol, low dose oxy     Fever    - had temp of 101.8 at 7:45am on 8/5    - no clear source of infection: UA appears clean, covid was negative, blood cultures neg to date    - due to mild hypoxia, 2 view CXR done 8/5 which prior hospitalist personally reviewed with Dr. Mcdaniel (radiology). He felt the changes in the LLL were due to atelectasis    - no antibiotics started    - had elevated WBC (17.5), today is 12.8 - this has improved only with IVF and not antibiotics    - no clear source of bacterial infection    - Patient did have a temperature of 100.1 (was under many blankets and his room was hot), rechecked 1 hour later and temperature had normalized without antipyretics     Rhabdomyolysis    - CK 7131>>4229    - Initially had been on IVF, these were discontinued and patient was able to  "tolerate PO intake     - Cr 1.96>> 1.63 (this is likely his baseline, last Cr was from 2016 at 1.6)      NED with CKD    - unclear what his recent baseline is, last creatinine in 2016 was 1.6 so I suspect he is now at his baseline.      - Creatinine was 1.96 on admission, 1.63 upon discharge     Blindness     GERD    - cont PPI     Discharge Disposition:  Discharged to short-term care facility     Allergies:  Allergies   Allergen Reactions     Monosodium Glutamate      Nsaids GI Disturbance     Soy         Condition on Discharge:  Discharge condition: Stable   Discharge vitals: Blood pressure (!) 156/75, pulse 94, temperature 98.4  F (36.9  C), temperature source Oral, resp. rate 18, height 1.753 m (5' 9\"), weight 81.5 kg (179 lb 11.2 oz), SpO2 91 %.   Code status on discharge: Full Code     History of Illness:  See detailed admission note for full details.    Physical Exam:  Blood pressure (!) 156/75, pulse 94, temperature 98.4  F (36.9  C), temperature source Oral, resp. rate 18, height 1.753 m (5' 9\"), weight 81.5 kg (179 lb 11.2 oz), SpO2 91 %.  Wt Readings from Last 1 Encounters:   08/06/20 81.5 kg (179 lb 11.2 oz)     General: Alert, awake, no acute distress.  HEENT: Normocephalic, atraumatic, eyes anicteric and without scleral injection, EOMI, MMM.  Cardiac: RRR, normal S1, S2.  No m/g/r. No LE edema.  Pulmonary: Normal chest rise, normal work of breathing.  Lungs CTAB, very faint wheeze in the RLL but this did clear when he coughed to clear his throat  Abdomen: soft, non-tender, non-distended.  Normoactive BS.  No guarding or rebound tenderness.  Extremities: no deformities.  Warm, well perfused.  Skin: no rashes or lesions noted.  Warm and Dry.  Neuro: No focal deficits noted.  Speech clear.  Moving extremities in bed.  Psych: Appropriate affect. Alert and oriented x3    Procedures other than Imaging:  IVF     Imaging:  Results for orders placed or performed during the hospital encounter of 08/05/20   Head " CT w/o contrast    Narrative    EXAM: CT HEAD W/O CONTRAST  LOCATION: [facility]  DATE/TIME: 8/5/2020 3:00 AM    INDICATION: Fall. Pain.  COMPARISON: CT head dated 06/03/2015.  TECHNIQUE: Routine without IV contrast. Multiplanar reformats. Dose reduction techniques were used.    FINDINGS:  INTRACRANIAL CONTENTS: No intracranial hemorrhage, extraaxial collection, or mass effect.  Age-indeterminate right basal ganglia lacunar type infarction. No acute large territory infarction. Moderate presumed chronic small vessel ischemic changes. Mild   to moderate generalized volume loss. No hydrocephalus.     VISUALIZED ORBITS/SINUSES/MASTOIDS: Prior bilateral cataract surgery. Visualized portions of the orbits are otherwise unremarkable. No paranasal sinus mucosal disease. No middle ear or mastoid effusion.    BONES/SOFT TISSUES: Left posterior scalp soft tissue contusion. No acute osseous abnormality.      Impression    IMPRESSION:  1.  No CT evidence for acute traumatic intracranial process.  2.  Age-indeterminate right basal ganglia lacunar type infarction. If the patient is experiencing an acute, focal or ongoing neurologic deficit, consider MRI for further assessment.  3.  Brain atrophy and presumed chronic microvascular ischemic changes as above.   Cervical spine CT w/o contrast    Narrative    EXAM: CT CERVICAL SPINE W/O CONTRAST  LOCATION: [facility]  DATE/TIME: 8/5/2020 3:00 AM    INDICATION: Trauma. Fall. Pain.  COMPARISON: None.  TECHNIQUE: Routine without IV contrast. Multiplanar reformats. Dose reduction techniques were used.    FINDINGS:  VERTEBRA: Normal vertebral body heights. 3 mm of grade 1 anterolisthesis at C4 on C5. Trace grade 1 anterolisthesis of the C7 on T1. No acute fracture.     CANAL/FORAMINA: Multilevel degenerative changes of the cervical spine. No high-grade central spinal canal stenosis. There is moderate left neural foraminal stenosis at C3-C4. There is severe left  neural foraminal stenosis at C4-C5. There is severe right   and moderate left neural foraminal stenosis at C5-C6. There is moderate bilateral neural foraminal stenosis at C6-C7.    PARASPINAL: No prevertebral soft tissue swelling.      Impression    IMPRESSION:  1.  No acute fracture identified.   2.  Grade 1 anterolisthesis at C4 on C5 and C7 on T1. These findings are likely degenerative in etiology, however, ligamentous injury could have a similar appearance.  3.  Multilevel degenerative changes of the cervical spine, as above.   CT Thoracic Spine w/o Contrast    Narrative    EXAM: CT THORACIC SPINE W/O CONTRAST  LOCATION: [facility]  DATE/TIME: 8/5/2020 3:00 AM    INDICATION: Trauma. Pain. Fall.  COMPARISON: None.  TECHNIQUE: Routine without IV contrast. Multiplanar reformats.  Dose reduction techniques were used.     FINDINGS:  VERTEBRA: Normal vertebral body heights. No acute traumatic malalignment. No acute fracture.     CANAL/FORAMINA: Multilevel degenerative changes are present. Multilevel anterolateral osteophytes are seen. No high-grade canal or neural foraminal stenosis.    PARASPINAL: No acute extraspinal abnormality. Scattered atheromatous vascular calcification of the aorta. Bilateral lower lobe dependent atelectasis is seen.      Impression    IMPRESSION:  1.  No fracture or posttraumatic subluxation.  2.  No high-grade spinal canal or neural foraminal stenosis.     Ribs XR, unilat 3 views + PA chest,  left    Narrative    EXAM: XR RIBS AND CHEST LEFT 3 VIEW  LOCATION: [facility]  DATE/TIME: 08/05/2020, 3:44 AM    INDICATION: Left-sided rib pain after fall.  COMPARISON: 03/10/2017.    FINDINGS: Frontal chest and four views of the left ribs. The heart size is normal. There is atelectasis at the left lung base. The lungs are otherwise clear. No pneumothorax. No acute rib fracture.      Impression    IMPRESSION: Left basilar atelectasis. No rib fractures.     XR Shoulder Left  G/E 3 Views    Narrative    EXAM: XR SHOULDER LEFT G/E 3 VIEW  LOCATION: [facility]  DATE/TIME: 08/05/2020, 3:45 AM    INDICATION: Pain after fall.  COMPARISON: None.      Impression    IMPRESSION: No acute fracture or dislocation.      XR Chest 2 Views    Narrative    CHEST TWO VIEWS  8/5/2020 12:29 PM     HISTORY: Mild hypoxia, fever.     COMPARISON: Earlier today.    FINDINGS: Suboptimal inspiration with hypoventilatory changes. Mild  left basilar atelectasis, increased compared with the previous exam.  Mild cardiomegaly again seen.      Impression    IMPRESSION:  I suspect a mild left pleural effusion. No acute  consolidation.    JELANI SCHROEDER MD        Consultations:  Consultations This Hospital Stay   PHYSICAL THERAPY ADULT IP CONSULT  OCCUPATIONAL THERAPY ADULT IP CONSULT  CARE COORDINATOR IP CONSULT  CARE COORDINATOR IP CONSULT  PHYSICAL THERAPY ADULT IP CONSULT  OCCUPATIONAL THERAPY ADULT IP CONSULT     Recent Lab Results:  Recent Labs   Lab 08/07/20 0527 08/06/20  0517 08/05/20  0217   WBC 12.8* 14.0* 17.5*   HGB 11.3* 10.2* 12.0*   HCT 35.6* 31.8* 36.6*   * 100 100    184 210     Recent Labs   Lab 08/07/20 0527 08/06/20  0517 08/05/20  0217    139 136   POTASSIUM 4.2 3.5 3.6   CHLORIDE 109 110* 108   CO2 19* 25 22   ANIONGAP 8 4 6   * 119* 138*   BUN 22 19 25   CR 1.63* 1.64* 1.96*   GFRESTIMATED 36* 36* 29*   GFRESTBLACK 42* 42* 34*   SUDHA 8.3* 7.8* 8.7          Pending Results:    Unresulted Labs Ordered in the Past 30 Days of this Admission     Date and Time Order Name Status Description    8/5/2020 0809 Blood culture Preliminary     8/5/2020 0809 Blood culture Preliminary            Discharge Instructions and Follow-Up:   Discharge Orders      General info for SNF    Length of Stay Estimate: Short Term Care: Estimated # of Days <30  Condition at Discharge: Stable  Level of care:skilled   Rehabilitation Potential: Good  Admission H&P remains valid and up-to-date:  Yes  Recent Chemotherapy: N/A  Use Nursing Home Standing Orders: Yes     Mantoux instructions    Give two-step Mantoux (PPD) Per Facility Policy Yes     Reason for your hospital stay    You were hospitalized for falls.  You did have a fever but tested negative for COVID-19.  You have not been treated with antibiotics as there was no source of bacterial infection found.  You will go to rehab for further therapy prior to returning home.     Activity - Up with nursing assistance     Full Code     Physical Therapy Adult Consult    Evaluate and treat as clinically indicated.    Reason:  Falls, deconditioning     Occupational Therapy Adult Consult    Evaluate and treat as clinically indicated.    Reason:  Falls, deconditioning     Fall precautions     Advance Diet as Tolerated    Follow this diet upon discharge: Orders Placed This Encounter      Combination Diet Regular Diet Adult     Discharge Medications   Current Discharge Medication List      START taking these medications    Details   bisacodyl (DULCOLAX) 10 MG suppository Place 1 suppository (10 mg) rectally daily as needed for constipation  Qty:      Associated Diagnoses: Constipation, unspecified constipation type      magnesium hydroxide (MILK OF MAGNESIA) 400 MG/5ML suspension Take 30 mLs by mouth daily as needed for constipation  Qty:      Associated Diagnoses: Constipation, unspecified constipation type      oxyCODONE (ROXICODONE) 5 MG tablet Take 0.5 tablets (2.5 mg) by mouth every 6 hours as needed for severe pain  Qty: 10 tablet, Refills: 0    Associated Diagnoses: Fall, initial encounter      senna-docusate (SENOKOT-S/PERICOLACE) 8.6-50 MG tablet Take 1 tablet by mouth 2 times daily as needed for constipation  Qty:      Associated Diagnoses: Constipation, unspecified constipation type         CONTINUE these medications which have CHANGED    Details   QUEtiapine (SEROQUEL) 50 MG tablet Take 1 tablet (50 mg) by mouth nightly as needed (sleep, agitation)     Associated Diagnoses: Insomnia, unspecified type         CONTINUE these medications which have NOT CHANGED    Details   acetaminophen (TYLENOL) 500 MG tablet Take 500-1,000 mg by mouth 3 times daily       amLODIPine (NORVASC) 10 MG tablet Take 1 tablet (10 mg) by mouth daily . Patient prefers generic.  Qty: 90 tablet, Refills: 3    Associated Diagnoses: HTN, goal below 140/90      dextromethorphan-guaiFENesin (MUCINEX DM)  MG 12 hr tablet Take 1 tablet by mouth daily as needed for cough      fish oil-omega-3 fatty acids 1000 MG capsule Take 1 g by mouth daily      multivitamin (CENTRUM SILVER) tablet Take 1 tablet by mouth daily      omeprazole (PRILOSEC) 20 MG DR capsule Take 20 mg by mouth daily      psyllium (METAMUCIL/KONSYL) Packet Take 1 packet by mouth daily      Lactobacillus Rhamnosus, GG, (CULTURELL) capsule Take 1 capsule by mouth 3 times daily  Qty: 60 capsule, Refills: 1    Associated Diagnoses: Sepsis, unspecified      loperamide (IMODIUM) 2 MG capsule Take 1 capsule (2 mg) by mouth 4 times daily as needed for diarrhea  Qty: 30 capsule, Refills: 1    Associated Diagnoses: Diarrhea      Multiple Vitamins-Minerals (MULTIVITAMIN & MINERAL PO) Take 1 tablet by mouth daily       tamsulosin (FLOMAX) 0.4 MG 24 hr capsule Take 1 capsule (0.4 mg) by mouth daily  Qty: 90 capsule, Refills: 3    Associated Diagnoses: Prostate cancer (H)         STOP taking these medications       caffeine (NO-DOZE) 200 MG TABS tablet Comments:   Reason for Stopping:         diphenhydrAMINE-acetaminophen (TYLENOL PM)  MG tablet Comments:   Reason for Stopping:               Time Spent on this Encounter   I, Kenyatta Davenport MD, personally saw the patient today and spent greater than 30 minutes discharging this patient.    Kenyatta Davenport MD

## 2020-08-07 NOTE — PLAN OF CARE
Occupational Therapy Discharge Summary    Reason for therapy discharge:    Discharged to transitional care facility.    Progress towards therapy goal(s). See goals on Care Plan in [facility] electronic health record for goal details.  Goals not met.  Barriers to achieving goals:   discharge from facility.    Therapy recommendation(s):    Continued therapy is recommended.  Rationale/Recommendations:  OT eval and treat at TCU.    **Pt not seen by discharging therapist on this date, note written based on previous treating therapist's notes and recommendations

## 2020-08-07 NOTE — PROGRESS NOTES
Your information has been submitted on August 07th, 2020 at 09:59:14 AM CDT. The confirmation number is [id number]

## 2020-08-07 NOTE — PROGRESS NOTES
Discharge Planner   Discharge Plans in progress: [facility] TCU today.  Barriers to discharge plan: None anticipated.   Follow up plan: [facility] TCU today. PAS being completed. Discharge orders faxed. HE WC transport arranged for 12:15pm. Facility notified. SW will remain available as needed.        Entered by: Rajni Duggan 08/07/2020 9:42 AM       PAM Speulveda, [facility]   Inpatient Care Coordination  [facility]   [phone]

## 2020-08-09 NOTE — PLAN OF CARE
Physical Therapy Discharge Summary    Reason for therapy discharge:    Discharged to transitional care facility.    Progress towards therapy goal(s). See goals on Care Plan in [facility] electronic health record for goal details.  Goals not met.  Barriers to achieving goals:   discharge from facility.    Therapy recommendation(s):    Continued therapy is recommended.  Rationale/Recommendations:  Patient would benefit from ongoing physical therapy in order to increase strength, activity toelrance and independence with mobility..

## 2020-08-11 LAB
BACTERIA SPEC CULT: NO GROWTH
BACTERIA SPEC CULT: NO GROWTH
SPECIMEN SOURCE: NORMAL
SPECIMEN SOURCE: NORMAL

## 2020-08-12 ENCOUNTER — RECORDS - HEALTHEAST (OUTPATIENT)
Dept: LAB | Facility: CLINIC | Age: 85
End: 2020-08-12

## 2020-08-13 LAB
ANION GAP SERPL CALCULATED.3IONS-SCNC: 11 MMOL/L (ref 5–18)
BUN SERPL-MCNC: 29 MG/DL (ref 8–28)
CALCIUM SERPL-MCNC: 9 MG/DL (ref 8.5–10.5)
CHLORIDE BLD-SCNC: 106 MMOL/L (ref 98–107)
CK SERPL-CCNC: 92 U/L (ref 30–190)
CO2 SERPL-SCNC: 24 MMOL/L (ref 22–31)
CREAT SERPL-MCNC: 1.52 MG/DL (ref 0.7–1.3)
ERYTHROCYTE [DISTWIDTH] IN BLOOD BY AUTOMATED COUNT: 13.1 % (ref 11–14.5)
GFR SERPL CREATININE-BSD FRML MDRD: 43 ML/MIN/1.73M2
GLUCOSE BLD-MCNC: 82 MG/DL (ref 70–125)
HCT VFR BLD AUTO: 31.9 % (ref 40–54)
HGB BLD-MCNC: 10.1 G/DL (ref 14–18)
MCH RBC QN AUTO: 32.1 PG (ref 27–34)
MCHC RBC AUTO-ENTMCNC: 31.7 G/DL (ref 32–36)
MCV RBC AUTO: 101 FL (ref 80–100)
PLATELET # BLD AUTO: 354 THOU/UL (ref 140–440)
PMV BLD AUTO: 10.4 FL (ref 8.5–12.5)
POTASSIUM BLD-SCNC: 3.9 MMOL/L (ref 3.5–5)
RBC # BLD AUTO: 3.15 MILL/UL (ref 4.4–6.2)
SODIUM SERPL-SCNC: 141 MMOL/L (ref 136–145)
WBC: 8.4 THOU/UL (ref 4–11)

## 2020-08-24 ENCOUNTER — RECORDS - HEALTHEAST (OUTPATIENT)
Dept: LAB | Facility: CLINIC | Age: 85
End: 2020-08-24

## 2020-08-25 ENCOUNTER — RECORDS - HEALTHEAST (OUTPATIENT)
Dept: LAB | Facility: CLINIC | Age: 85
End: 2020-08-25

## 2020-08-25 LAB — HGB BLD-MCNC: 10 G/DL (ref 14–18)

## 2020-08-26 LAB — HGB BLD-MCNC: 9.4 G/DL (ref 14–18)

## 2020-08-30 ENCOUNTER — RECORDS - HEALTHEAST (OUTPATIENT)
Dept: LAB | Facility: CLINIC | Age: 85
End: 2020-08-30

## 2020-08-31 LAB — HGB BLD-MCNC: 10.8 G/DL (ref 14–18)
